# Patient Record
Sex: FEMALE | Race: BLACK OR AFRICAN AMERICAN | Employment: FULL TIME | URBAN - METROPOLITAN AREA
[De-identification: names, ages, dates, MRNs, and addresses within clinical notes are randomized per-mention and may not be internally consistent; named-entity substitution may affect disease eponyms.]

---

## 2017-09-25 ENCOUNTER — HOSPITAL ENCOUNTER (OUTPATIENT)
Dept: LAB | Age: 32
Discharge: HOME | End: 2017-09-25
Payer: COMMERCIAL

## 2017-09-25 ENCOUNTER — OFFICE VISIT (OUTPATIENT)
Dept: OBGYN CLINIC | Age: 32
End: 2017-09-25

## 2017-09-25 VITALS
HEIGHT: 72 IN | BODY MASS INDEX: 34.29 KG/M2 | DIASTOLIC BLOOD PRESSURE: 89 MMHG | HEART RATE: 78 BPM | WEIGHT: 253.2 LBS | SYSTOLIC BLOOD PRESSURE: 131 MMHG

## 2017-09-25 DIAGNOSIS — R35.0 URINARY FREQUENCY: ICD-10-CM

## 2017-09-25 DIAGNOSIS — Z01.419 WELL WOMAN EXAM WITH ROUTINE GYNECOLOGICAL EXAM: Primary | ICD-10-CM

## 2017-09-25 LAB
BILIRUB UR QL STRIP: NEGATIVE
GLUCOSE UR-MCNC: NEGATIVE MG/DL
KETONES P FAST UR STRIP-MCNC: NEGATIVE MG/DL
PH UR STRIP: 5.5 [PH] (ref 4.6–8)
PROT UR QL STRIP: NEGATIVE MG/DL
SP GR UR STRIP: 1.02 (ref 1–1.03)
UA UROBILINOGEN AMB POC: NORMAL (ref 0.2–1)
URINALYSIS CLARITY POC: CLEAR
URINALYSIS COLOR POC: YELLOW
URINE BLOOD POC: NORMAL
URINE LEUKOCYTES POC: NORMAL
URINE NITRITES POC: POSITIVE

## 2017-09-25 PROCEDURE — 87086 URINE CULTURE/COLONY COUNT: CPT | Performed by: OBSTETRICS & GYNECOLOGY

## 2017-09-25 PROCEDURE — 87077 CULTURE AEROBIC IDENTIFY: CPT | Performed by: OBSTETRICS & GYNECOLOGY

## 2017-09-25 PROCEDURE — 88175 CYTOPATH C/V AUTO FLUID REDO: CPT | Performed by: OBSTETRICS & GYNECOLOGY

## 2017-09-25 PROCEDURE — 87624 HPV HI-RISK TYP POOLED RSLT: CPT | Performed by: OBSTETRICS & GYNECOLOGY

## 2017-09-25 PROCEDURE — 87186 SC STD MICRODIL/AGAR DIL: CPT | Performed by: OBSTETRICS & GYNECOLOGY

## 2017-09-25 RX ORDER — NITROFURANTOIN 25; 75 MG/1; MG/1
100 CAPSULE ORAL 2 TIMES DAILY
Qty: 10 CAP | Refills: 0 | Status: SHIPPED | OUTPATIENT
Start: 2017-09-25 | End: 2017-09-30

## 2017-09-25 NOTE — MR AVS SNAPSHOT
Visit Information Date & Time Provider Department Dept. Phone Encounter #  
 9/25/2017  9:15 AM Sheryn Boast [facility] [phone] [id number] Follow-up Instructions Return if symptoms worsen or fail to improve. Upcoming Health Maintenance Date Due  
 PAP AKA CERVICAL CYTOLOGY 1/7/2007 INFLUENZA AGE 9 TO ADULT 8/1/2017 Allergies as of 9/25/2017  Review Complete On: 9/25/2017 By: Sheryn Boast, MD  
 No Known Allergies Current Immunizations  Reviewed on 7/29/2016 Name Date Tdap 5/18/2016  4:10 PM  
  
 Not reviewed this visit You Were Diagnosed With   
  
 Codes Comments Well woman exam with routine gynecological exam    -  Primary ICD-10-CM: F85.539 ICD-9-CM: V72.31 Urinary frequency     ICD-10-CM: R35.0 ICD-9-CM: 788.41 Vitals BP Pulse Height(growth percentile) Weight(growth percentile) LMP BMI  
 131/89 78 6' 3\" (1.905 m) 253 lb 3.2 oz (114.9 kg) 08/17/2017 31.65 kg/m2 OB Status Smoking Status Unknown Never Smoker BMI and BSA Data Body Mass Index Body Surface Area  
 31.65 kg/m 2 2.47 m 2 Preferred Pharmacy Pharmacy Name Phone [facility], [address] [place] [address] [phone] Your Updated Medication List  
  
   
This list is accurate as of: 9/25/17 10:12 AM.  Always use your most recent med list. AMBULATORY BREAST PUMP Double electric breast pump of patient's choice. Use as directed for breast feeding. calcium carbonate 200 mg calcium (500 mg) Chew Commonly known as:  TUMS Take 1 Tab by mouth daily. ibuprofen 800 mg tablet Commonly known as:  MOTRIN Take 1 Tab by mouth every eight (8) hours as needed. nitrofurantoin (macrocrystal-monohydrate) 100 mg capsule Commonly known as:  MACROBID Take 1 Cap by mouth two (2) times a day for 5 days.  Indications: E. COLI URINARY TRACT INFECTION  
  
 norethindrone 0.35 mg Tab Commonly known as:  Law & Law Take 1 Tab by mouth daily. PRENATAL DHA+COMPLETE PRENATAL -300 mg-mcg-mg Cmpk Generic drug:  [id number] tina-folic-dha Take  by mouth. senna-docusate 8.6-50 mg per tablet Commonly known as:  Torin Cardenas Take 1 Tab by mouth two (2) times daily as needed for Constipation. Prescriptions Sent to Pharmacy Refills  
 nitrofurantoin, macrocrystal-monohydrate, (MACROBID) 100 mg capsule 0 Sig: Take 1 Cap by mouth two (2) times a day for 5 days. Indications: E. COLI URINARY TRACT INFECTION Class: Normal  
 Pharmacy: [address], [address] [place] [address] Ph #: [phone] Route: Oral  
  
We Performed the Following AMB POC URINALYSIS DIP STICK AUTO W/O MICRO [47046 CPT(R)] Follow-up Instructions Return if symptoms worsen or fail to improve. Patient Instructions Learning About Stress What is stress? Stress is what you feel when you have to handle more than you are used to. Stress is a fact of life for most people, and it affects everyone differently. What causes stress for you may not be stressful for someone else. A lot of things can cause stress. You may feel stress when you go on a job interview, take a test, or run a race. This kind of short-term stress is normal and even useful. It can help you if you need to work hard or react quickly. For example, stress can help you finish an important job on time. Stress also can last a long time. Long-term stress is caused by stressful situations or events. Examples of long-term stress include long-term health problems, ongoing problems at work, or conflicts in your family. Long-term stress can harm your health. How does stress affect your health? When you are stressed, your body responds as though you are in danger.  It makes hormones that speed up your heart, make you breathe faster, and give you a burst of energy. This is called the fight-or-flight stress response. If the stress is over quickly, your body goes back to normal and no harm is done. But if stress happens too often or lasts too long, it can have bad effects. Long-term stress can make you more likely to get sick, and it can make symptoms of some diseases worse. If you tense up when you are stressed, you may develop neck, shoulder, or low back pain. Stress is linked to high blood pressure and heart disease. Stress also harms your emotional health. It can make you rojas, tense, or depressed. Your relationships may suffer, and you may not do well at work or school. What can you do to manage stress? How to relax your mind · Write. It may help to write about things that are bothering you. This helps you find out how much stress you feel and what is causing it. When you know this, you can find better ways to cope. · Let your feelings out. Talk, laugh, cry, and express anger when you need to. Talking with friends, family, a counselor, or a member of the clergy about your feelings is a healthy way to relieve stress. · Do something you enjoy. For example, listen to music or go to a movie. Practice your hobby or do volunteer work. · Meditate. This can help you relax, because you are not worrying about what happened before or what may happen in the future. · Do guided imagery. Imagine yourself in any setting that helps you feel calm. You can use audiotapes, books, or a teacher to guide you. How to relax your body · Do something active. Exercise or activity can help reduce stress. Walking is a great way to get started. Even everyday activities such as housecleaning or yard work can help. · Do breathing exercises. For example: ¨ From a standing position, bend forward from the waist with your knees slightly bent. Let your arms dangle close to the floor. ¨ Breathe in slowly and deeply as you return to a standing position.  Roll up slowly and lift your head last. 
¨ Hold your breath for just a few seconds in the standing position. ¨ Breathe out slowly and bend forward from the waist. 
· Try yoga or drew chi. These techniques combine exercise and meditation. You may need some training at first to learn them. What can you do to prevent stress? · Manage your time. This helps you find time to do the things you want and need to do. · Get enough sleep. Your body recovers from the stresses of the day while you are sleeping. · Get support. Your family, friends, and community can make a difference in how you experience stress. Where can you learn more? Go to http://damionPicklifyashish.info/. Enter P105 in the search box to learn more about \"Learning About Stress. \" Current as of: July 26, 2016 Content Version: 11.3 © 4553-5565 Cursogram. Care instructions adapted under license by [employer] (which disclaims liability or warranty for this information). If you have questions about a medical condition or this instruction, always ask your healthcare professional. [place] [address] any warranty or liability for your use of this information. Urinary Tract Infection in Women: Care Instructions Your Care Instructions A urinary tract infection, or UTI, is a general term for an infection anywhere between the kidneys and the urethra (where urine comes out). Most UTIs are bladder infections. They often cause pain or burning when you urinate. UTIs are caused by bacteria and can be cured with antibiotics. Be sure to complete your treatment so that the infection goes away. Follow-up care is a key part of your treatment and safety. Be sure to make and go to all appointments, and call your doctor if you are having problems. It's also a good idea to know your test results and keep a list of the medicines you take. How can you care for yourself at home? · Take your antibiotics as directed. Do not stop taking them just because you feel better. You need to take the full course of antibiotics. · Drink extra water and other fluids for the next day or two. This may help wash out the bacteria that are causing the infection. (If you have kidney, heart, or liver disease and have to limit fluids, talk with your doctor before you increase your fluid intake.) · Avoid drinks that are carbonated or have caffeine. They can irritate the bladder. · Urinate often. Try to empty your bladder each time. · To relieve pain, take a hot bath or lay a heating pad set on low over your lower belly or genital area. Never go to sleep with a heating pad in place. To prevent UTIs · Drink plenty of water each day. This helps you urinate often, which clears bacteria from your system. (If you have kidney, heart, or liver disease and have to limit fluids, talk with your doctor before you increase your fluid intake.) · Urinate when you need to. · Urinate right after you have sex. · Change sanitary pads often. · Avoid douches, bubble baths, feminine hygiene sprays, and other feminine hygiene products that have deodorants. · After going to the bathroom, wipe from front to back. When should you call for help? Call your doctor now or seek immediate medical care if: · Symptoms such as fever, chills, nausea, or vomiting get worse or appear for the first time. · You have new pain in your back just below your rib cage. This is called flank pain. · There is new blood or pus in your urine. · You have any problems with your antibiotic medicine. Watch closely for changes in your health, and be sure to contact your doctor if: 
· You are not getting better after taking an antibiotic for 2 days. · Your symptoms go away but then come back. Where can you learn more? Go to http://damion-ashish.info/.  
Enter F522 in the search box to learn more about \"Urinary Tract Infection in Women: Care Instructions. \" Current as of: November 28, 2016 Content Version: 11.3 © 0350-3110 Finjan. Care instructions adapted under license by [employer] (which disclaims liability or warranty for this information). If you have questions about a medical condition or this instruction, always ask your healthcare professional. [address] any warranty or liability for your use of this information. Introducing [facility]! Pradip Edwards introduces Hive Media patient portal. Now you can access parts of your medical record, email your doctor's office, and request medication refills online. 1. In your internet browser, go to [email or web address] 2. Click on the First Time User? Click Here link in the Sign In box. You will see the New Member Sign Up page. 3. Enter your Hive Media Access Code exactly as it appears below. You will not need to use this code after youve completed the sign-up process. If you do not sign up before the expiration date, you must request a new code. · Hive Media Access Code: [email or web address] Expires: 12/24/2017  9:27 AM 
 
4. Enter the last four digits of your Social Security Number (xxxx) and Date of Birth (mm/dd/yyyy) as indicated and click Submit. You will be taken to the next sign-up page. 5. Create a Hive Media ID. This will be your Hive Media login ID and cannot be changed, so think of one that is secure and easy to remember. 6. Create a Hive Media password. You can change your password at any time. 7. Enter your Password Reset Question and Answer. This can be used at a later time if you forget your password. 8. Enter your e-mail address. You will receive e-mail notification when new information is available in [address] 9. Click Sign Up. You can now view and download portions of your medical record. 10. Click the Download Summary menu link to download a portable copy of your medical information. If you have questions, please visit the Frequently Asked Questions section of the Avitidet website. Remember, maufait is NOT to be used for urgent needs. For medical emergencies, dial 911. Now available from your iPhone and Android! Please provide this summary of care documentation to your next provider. Your primary care clinician is listed as NONE. If you have any questions after today's visit, please call [phone].

## 2017-09-25 NOTE — PROGRESS NOTES
Subjective:   33 y.o. female for Well Woman Check. She also notes urinary urgency and frequency for the past 2 weeks on and off. She is under increased stress due to the recent death of her Grandfather. She states she has been taking frequent car trips and holding her urine longer than she should. Patient's last menstrual period was 08/17/2017. Social History: single partner, contraception - condoms. Pertinent past medical hstory: no history of HTN, DVT, CAD, DM, liver disease, migraines or smoking. Patient Active Problem List   Diagnosis Code    Hypoglycemia E16.2    Normal pregnancy Z34.90    Post-term pregnancy, 40-42 weeks of gestation O46.0    Post-dates pregnancy O48.0     Current Outpatient Prescriptions   Medication Sig Dispense Refill    nitrofurantoin, macrocrystal-monohydrate, (MACROBID) 100 mg capsule Take 1 Cap by mouth two (2) times a day for 5 days. Indications: E. COLI URINARY TRACT INFECTION 10 Cap 0    calcium carbonate (TUMS) 200 mg calcium (500 mg) chew Take 1 Tab by mouth daily.  PNV no.24-iron-folic acid-dha (PRENATAL DHA+COMPLETE PRENATAL) -300 mg-mcg-mg cmpk Take  by mouth.  norethindrone (MICRONOR) 0.35 mg tab Take 1 Tab by mouth daily. 28 Tab 11    ibuprofen (MOTRIN) 800 mg tablet Take 1 Tab by mouth every eight (8) hours as needed. 30 Tab 1    senna-docusate (PERICOLACE) 8.6-50 mg per tablet Take 1 Tab by mouth two (2) times daily as needed for Constipation. 60 Tab 0    AMBULATORY BREAST PUMP Double electric breast pump of patient's choice. Use as directed for breast feeding.  1 Units 0     No Known Allergies  Past Medical History:   Diagnosis Date    Hypoglycemia     Hypoglycemia 8/2015    Normal pregnancy 5/4/2016    Post-term pregnancy, 40-42 weeks of gestation 7/26/2016     Past Surgical History:   Procedure Laterality Date    HX OTHER SURGICAL Right 10/2015    right upper second molar taken out     Family History   Problem Relation Age of Onset  Hypertension Mother     Diabetes Mother     High Cholesterol Mother     Heart Disease Father     Hypertension Father     High Cholesterol Father      Social History   Substance Use Topics    Smoking status: Never Smoker    Smokeless tobacco: Never Used    Alcohol use No        ROS:  Feeling well. No dyspnea or chest pain on exertion. No abdominal pain, change in bowel habits, black or bloody stools. No urinary tract symptoms. GYN ROS: normal menses, no abnormal bleeding, pelvic pain or discharge, no breast pain or new or enlarging lumps on self exam. No neurological complaints. Objective:     Visit Vitals    /89    Pulse 78    Ht 6' 3\" (1.905 m)    Wt 253 lb 3.2 oz (114.9 kg)    LMP 2017    BMI 31.65 kg/m2     The patient appears well, alert, oriented x 3, in no distress. ENT normal.  Neck supple. No adenopathy or thyromegaly. ALLEN. Lungs are clear, good air entry, no wheezes, rhonchi or rales. S1 and S2 normal, no murmurs, regular rate and rhythm. Abdomen soft without tenderness, guarding, mass or organomegaly. Extremities show no edema, normal peripheral pulses. Neurological is normal, no focal findings. BREAST EXAM: breasts appear normal, no suspicious masses, no skin or nipple changes or axillary nodes    PELVIC EXAM: normal external genitalia, vulva, vagina, cervix, uterus and adnexa    U/A: + leuk; + nitrites; S.025    Assessment/Plan:   well woman with UTI symptoms and corresponding urinalysis. pap smear  counseled on breast self exam and family planning choices  return annually or prn    ICD-10-CM ICD-9-CM    1. Well woman exam with routine gynecological exam Z01.419 V72.31 PAP IG, APTIMA HPV AND RFX 16/18,45 ([id number])   2. Urinary frequency R35.0 788.41 AMB POC URINALYSIS DIP STICK AUTO W/O MICRO      CULTURE, URINE      nitrofurantoin, macrocrystal-monohydrate, (MACROBID) 100 mg capsule   .

## 2017-09-25 NOTE — PATIENT INSTRUCTIONS
Learning About Stress  What is stress? Stress is what you feel when you have to handle more than you are used to. Stress is a fact of life for most people, and it affects everyone differently. What causes stress for you may not be stressful for someone else. A lot of things can cause stress. You may feel stress when you go on a job interview, take a test, or run a race. This kind of short-term stress is normal and even useful. It can help you if you need to work hard or react quickly. For example, stress can help you finish an important job on time. Stress also can last a long time. Long-term stress is caused by stressful situations or events. Examples of long-term stress include long-term health problems, ongoing problems at work, or conflicts in your family. Long-term stress can harm your health. How does stress affect your health? When you are stressed, your body responds as though you are in danger. It makes hormones that speed up your heart, make you breathe faster, and give you a burst of energy. This is called the fight-or-flight stress response. If the stress is over quickly, your body goes back to normal and no harm is done. But if stress happens too often or lasts too long, it can have bad effects. Long-term stress can make you more likely to get sick, and it can make symptoms of some diseases worse. If you tense up when you are stressed, you may develop neck, shoulder, or low back pain. Stress is linked to high blood pressure and heart disease. Stress also harms your emotional health. It can make you rojas, tense, or depressed. Your relationships may suffer, and you may not do well at work or school. What can you do to manage stress? How to relax your mind  · Write. It may help to write about things that are bothering you. This helps you find out how much stress you feel and what is causing it. When you know this, you can find better ways to cope. · Let your feelings out.  Talk, laugh, cry, and express anger when you need to. Talking with friends, family, a counselor, or a member of the clergy about your feelings is a healthy way to relieve stress. · Do something you enjoy. For example, listen to music or go to a movie. Practice your hobby or do volunteer work. · Meditate. This can help you relax, because you are not worrying about what happened before or what may happen in the future. · Do guided imagery. Imagine yourself in any setting that helps you feel calm. You can use audiotapes, books, or a teacher to guide you. How to relax your body  · Do something active. Exercise or activity can help reduce stress. Walking is a great way to get started. Even everyday activities such as housecleaning or yard work can help. · Do breathing exercises. For example:  ¨ From a standing position, bend forward from the waist with your knees slightly bent. Let your arms dangle close to the floor. ¨ Breathe in slowly and deeply as you return to a standing position. Roll up slowly and lift your head last.  ¨ Hold your breath for just a few seconds in the standing position. ¨ Breathe out slowly and bend forward from the waist.  · Try yoga or drew chi. These techniques combine exercise and meditation. You may need some training at first to learn them. What can you do to prevent stress? · Manage your time. This helps you find time to do the things you want and need to do. · Get enough sleep. Your body recovers from the stresses of the day while you are sleeping. · Get support. Your family, friends, and community can make a difference in how you experience stress. Where can you learn more? Go to http://damion-ashish.info/. Enter J249 in the search box to learn more about \"Learning About Stress. \"  Current as of: July 26, 2016  Content Version: 11.3  © 9760-2631 ImaginAb, [employer].  Care instructions adapted under license by Me[employer]ob (which disclaims liability or warranty for this information). If you have questions about a medical condition or this instruction, always ask your healthcare professional. [place] [address] any warranty or liability for your use of this information. Urinary Tract Infection in Women: Care Instructions  Your Care Instructions    A urinary tract infection, or UTI, is a general term for an infection anywhere between the kidneys and the urethra (where urine comes out). Most UTIs are bladder infections. They often cause pain or burning when you urinate. UTIs are caused by bacteria and can be cured with antibiotics. Be sure to complete your treatment so that the infection goes away. Follow-up care is a key part of your treatment and safety. Be sure to make and go to all appointments, and call your doctor if you are having problems. It's also a good idea to know your test results and keep a list of the medicines you take. How can you care for yourself at home? · Take your antibiotics as directed. Do not stop taking them just because you feel better. You need to take the full course of antibiotics. · Drink extra water and other fluids for the next day or two. This may help wash out the bacteria that are causing the infection. (If you have kidney, heart, or liver disease and have to limit fluids, talk with your doctor before you increase your fluid intake.)  · Avoid drinks that are carbonated or have caffeine. They can irritate the bladder. · Urinate often. Try to empty your bladder each time. · To relieve pain, take a hot bath or lay a heating pad set on low over your lower belly or genital area. Never go to sleep with a heating pad in place. To prevent UTIs  · Drink plenty of water each day. This helps you urinate often, which clears bacteria from your system. (If you have kidney, heart, or liver disease and have to limit fluids, talk with your doctor before you increase your fluid intake.)  · Urinate when you need to.   · Urinate right after you have sex. · Change sanitary pads often. · Avoid douches, bubble baths, feminine hygiene sprays, and other feminine hygiene products that have deodorants. · After going to the bathroom, wipe from front to back. When should you call for help? Call your doctor now or seek immediate medical care if:  · Symptoms such as fever, chills, nausea, or vomiting get worse or appear for the first time. · You have new pain in your back just below your rib cage. This is called flank pain. · There is new blood or pus in your urine. · You have any problems with your antibiotic medicine. Watch closely for changes in your health, and be sure to contact your doctor if:  · You are not getting better after taking an antibiotic for 2 days. · Your symptoms go away but then come back. Where can you learn more? Go to http://damion-ashish.info/. Enter L508 in the search box to learn more about \"Urinary Tract Infection in Women: Care Instructions. \"  Current as of: November 28, 2016  Content Version: 11.3  © 1460-0449 [employer]Flag Software. Care instructions adapted under license by [employer] (which disclaims liability or warranty for this information). If you have questions about a medical condition or this instruction, always ask your healthcare professional. [address] any warranty or liability for your use of this information.

## 2017-09-27 LAB
BACTERIA SPEC CULT: ABNORMAL
SERVICE CMNT-IMP: ABNORMAL

## 2018-05-09 ENCOUNTER — HOSPITAL ENCOUNTER (OUTPATIENT)
Dept: PHYSICAL THERAPY | Age: 33
Discharge: HOME | End: 2018-05-09
Payer: OTHER MISCELLANEOUS

## 2018-05-09 PROCEDURE — 97014 ELECTRIC STIMULATION THERAPY: CPT | Performed by: PHYSICAL THERAPIST

## 2018-05-09 PROCEDURE — 97161 PT EVAL LOW COMPLEX 20 MIN: CPT | Performed by: PHYSICAL THERAPIST

## 2018-05-09 PROCEDURE — 97110 THERAPEUTIC EXERCISES: CPT | Performed by: PHYSICAL THERAPIST

## 2018-05-09 PROCEDURE — 97140 MANUAL THERAPY 1/> REGIONS: CPT | Performed by: PHYSICAL THERAPIST

## 2018-05-09 NOTE — PROGRESS NOTES
PT DAILY TREATMENT NOTE/SHOULDER EVAL 3-16    Patient Name: Chepe Cardona  Date:2018  : 1985  [x]  Patient  Verified  Payor: Ryland Curry [id number] / Plan: [id number] / Product Type: Workers Comp /    In [place]blar Maikel time:6:10  Total Treatment Time (min): 62  Visit #: 1 of 12    Treatment Area: Left shoulder pain [M25.512]    SUBJECTIVE   Pain Level (0-10 scale): 4-7/10  []constant []intermittent []improving [x]worsening []no change since onset    Any medication changes, allergies to medications, adverse drug reactions, diagnosis change, or new procedure performed?: [x] No    [] Yes (see summary sheet for update)  Subjective functional status/changes:     PLOF: Independent with no limitiations  Limitations to PLOF: Lifting, transferring patients, overhead activity, abduction  Mechanism of Injury: Patient was transferring a total assist patient, had increase in left shoulder pain. Current symptoms/Complaints: Patient is a 30 y/o female who presents to outpatient PT with primary complaint of left shoulder pain that began 18 after lifting a patient at work. 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   -Pain also in posterior RTC  -2015 injured left shoulder, had therapy  -denies numbness and tingling   -Pain increases with movement or if patient did a lot of work.   -Has been performing gentle exercises.   -Abduction and ER increases pain the most.   -Right handed  Previous Treatment/Compliance: Patient had therapy in  for left shoulder pain. PMHx/Surgical Hx: NA  Work Hx: Patient works as therapist in [address] [place] [address] [employer]  Living Situation: Has 3year old at home.    Pt Goals: \"Pain free, full ROM\"  Barriers: [x]pain []financial []time []transportation []other  Motivation: excellent  Substance use: []Alcohol []Tobacco []other:   FABQ Score: []low []elevate  Cognition: A & O x 4    Other:    OBJECTIVE/EXAMINATION      Modality rationale: decrease edema, decrease inflammation and decrease pain to improve the patients ability to perform daily activities with decreased pain and symptom levels     Min Type Additional Details   12 [x] Estim:  [x]Unatt       [x]IFC  []Premod                        []Other:  [x]w/ice   []w/heat  Position:  Location:    [] Estim: []Att    []TENS instruct  []NMES                    []Other:  []w/US   []w/ice   []w/heat  Position:  Location:    []  Traction: [] Cervical       []Lumbar                       [] Prone          []Supine                       []Intermittent   []Continuous Lbs:  [] before manual  [] after manual    []  Ultrasound: []Continuous   [] Pulsed                           []1MHz   []3MHz Location:  W/cm2:    []  Iontophoresis with dexamethasone         Location: [] Take home patch   [] In clinic    []  Ice     []  heat  []  Ice massage  []  Laser   []  Anodyne Position:  Location:    []  Laser with stim  []  Other: Position:  Location:    []  Vasopneumatic Device Pressure:       [] lo [] med [] hi   Temperature: [] lo [] med [] hi   [] Skin assessment post-treatment:  []intact []redness- no adverse reaction    []redness  adverse reaction:     15 min [x]Eval                  []Re-Eval       15 min Therapeutic Exercise:  [x] See flow sheet :   Rationale: increase ROM, increase strength and improve coordination to improve the patients ability to perform daily activities with decreased pain and symptom levels        20 min Manual Therapy:  STM to left UT, LS, rhomboids, infraspinatus with use of therapeutic cupping.     Rationale: decrease pain, increase ROM and increase tissue extensibility to perform daily activities with decreased pain and symptom levels                With   [] TE   [] TA   [] neuro   [] other: Patient Education: [x] Review HEP    [] Progressed/Changed HEP based on:   [] positioning   [] body mechanics   [] transfers [] heat/ice application    [] other:      Other Objective/Functional Measures: FOTO: 59    Physical Therapy Evaluation - Shoulder    Posture: [] Poor    [x] Fair    [] Good    Describe: Rounded shoulders bilaterally left > right. Elevated left shoulder blade. ROM:  [] Unable to assess at this time                                           AROM                                                              PROM   Left Right  Left Right   Flexion 120 pain WNL Flexion 150    Scaption/ABD 95 pain WNL Scaptin/    ER @ 90 Degrees WNL WNL ER @ 90 Degrees WNL    IR @ 90 Degrees WNL WNL IR @ 90 Degrees WNL      End Feel / Painful Arc:    Strength:   [] Unable to assess at this time                                                                            L (1-5) R (1-5) Pain   Flexors 4- 5 [x] Yes   [] No   Abductors 4 5 [] Yes   [] No   External Rotators 4+ 5 [] Yes   [] No   Internal Rotators 4+ 5 [] Yes   [] No   Supraspinatus 4- 5 [] Yes   [] No   Serratus Anterior 4- 4 [] Yes   [] No   Lower Trapezius 3+ 4 [] Yes   [] No   Elbow Flexion 4 5 [] Yes   [] No   Elbow Extension 4 5 [] Yes   [] No       Scapulohumoral Control / Rhythm:  Able to eccentrically lower with good control?  Left: [x] Yes   [] No     Right: [x] Yes   [] No    Accessory Motions:     Palpation  -TTP noted left infraspinatus, supraspinatus and long head of biceps      Optional Tests:    Sensation Left Right Reflexes Left Right   Biceps (C5)   Biceps (C5)     Luong Radial(C6-7)   Brachioradialis (C6)     Luong Ulnar(C8-T1)   Triceps (C7)       Adson's Test  [] Pos   [x] Neg Yergason's Test [] Pos   [] Neg  Alexandra's Test  [] Pos   [x] Neg Maya's Sign [] Pos   [] Neg  Neer's Test  [x] Pos   [] Neg Clunk Test  [] Pos   [] Neg  Hawkin's Test  [x] Pos   [] Neg AC Joint  [x] Pos   [] Neg  Speed's Test  [x] Pos   [] Neg SC Joint  [] Pos   [] Neg  Empty Can  [] Pos   [x] Neg Pectoral Tightness [x] Pos   [] Neg  Anterior Apprehension [] Pos   [x] Neg   Posterior Apprehension [] Pos   [x] Neg      Other Tests / Comments:    -Laxity noted in GHJ bilaterally  -Decreased rib mobility noted on left side with deep breathing,   -hypomobility noted in thoracic spine. Pain Level (0-10 scale) post treatment: 3/10    ASSESSMENT/Changes in Function: Patient is a 30 y/o female who presents to outpatient PT with primary complaint of acute left shoulder pain that began 5/4/18 after transferring a total assist patient. 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 Patient demonstrates pain in supraspinatus and long head of biceps tendon and pain with impingement testing consistent with muscular strain. Patient's impairments include decreased rib and thoracic spine mobility, decreased scapular stabilization, GHJ hypermobility and poor posture. Patient will continue to benefit from skilled PT services to modify and progress therapeutic interventions, address functional mobility deficits, address ROM deficits, address strength deficits, analyze and address soft tissue restrictions, analyze and cue movement patterns, analyze and modify body mechanics/ergonomics and assess and modify postural abnormalities to attain remaining goals. []  See Plan of Care  []  See progress note/recertification  []  See Discharge Summary         Progress towards goals / Updated goals:  Short Term Goals: STG- To be accomplished in 2 week(s):  1. Pt will be independent with HEP to encourage prophylaxis. Eval:Initiated this session. Current: NA    Long Term Goals: LTG- To be accomplished in 4 week(s):  1. Pt will demonstrate ability to return to work full time and at full duty without increased left shoulder pain. Eval:Currently on light duty with pain increasing to 7/10 at the end of the day. Current: NA    2.   Pt will increase left shoulder strength to > 4+/5 in order to improve scapular stabilization during functional overhead activities. Eval:   L (1-5) R (1-5)   Flexors 4- 5   Abductors 4 5   External Rotators 4+ 5   Internal Rotators 4+ 5   Supraspinatus 4- 5   Serratus Anterior 4- 4   Lower Trapezius 3+ 4     Current: NA    3. Pt will increase pain free AROM in left shoulder to return to PLOF at work. Eval:   Left Right   Flexion 120 pain WNL   Scaption/ABD 95 pain WNL   ER @ 90 Degrees WNL WNL   IR @ 90 Degrees WNL WNL     Current: NA    4. Pt FOTO score will increase by >15 points to show improvement in subjective shoulder function.    Eval:59  Current: will address at visit 5    PLAN  [x]  Upgrade activities as tolerated     [x]  Continue plan of care  []  Update interventions per flow sheet       []  Discharge due to:_  []  Other:_      Hiro Sibley, PT 5/9/2018  5:09 PM

## 2018-05-09 NOTE — PROGRESS NOTES
[facility] at the [address], [place], [address] [facility]  Phone: [phone]      Fax:  [phone]       Plan of Care/ Statement of Necessity for Physical Therapy Services      Patient name: Cha Damon Start of Care: 2018   Referral source: Virginia Tuttle MD : 1985    Medical Diagnosis: Left shoulder pain [M25.512]   Onset Date:18    Treatment Diagnosis: Left shoulder pain   Prior Hospitalization: see medical history Provider#: [id number]   Medications: Verified on Patient summary List    Comorbidities: NA   Prior Level of Function: Independent with no limitiations  Limitations to PLOF: Lifting, transferring patients, overhead activity, abduction      The Plan of Care and following information is based on the information from the initial evaluation. Assessment/ key information: Patient is a 30 y/o female who presents to outpatient PT with primary complaint of acute left shoulder pain that began 18 after transferring a total assist patient. 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 Patient demonstrates pain in supraspinatus and long head of biceps tendon and pain with impingement testing consistent with muscular strain. Patient's impairments include decreased rib and thoracic spine mobility, decreased scapular stabilization, GHJ hypermobility and poor posture.      Patient will continue to benefit from skilled PT services to modify and progress therapeutic interventions, address functional mobility deficits, address ROM deficits, address strength deficits, analyze and address soft tissue restrictions, analyze and cue movement patterns, analyze and modify body mechanics/ergonomics and assess and modify postural abnormalities to attain remaining goals.     Evaluation Complexity History LOW Complexity : Zero comorbidities / personal factors that will impact the outcome / POC; Examination LOW Complexity : 1-2 Standardized tests and measures addressing body structure, function, activity limitation and / or participation in recreation  ;Presentation LOW Complexity : Stable, uncomplicated  ;Clinical Decision Making MEDIUM Complexity : FOTO score of 26-74  Overall Complexity Rating: LOW   Problem List: pain affecting function, decrease ROM, decrease strength, decrease ADL/ functional abilitiies, decrease activity tolerance and decrease flexibility/ joint mobility   Treatment Plan may include any combination of the following: Therapeutic exercise, Therapeutic activities, Neuromuscular re-education, Physical agent/modality, Manual therapy, Patient education and Functional mobility training  Patient / Family readiness to learn indicated by: asking questions and interest  Persons(s) to be included in education: patient (P)  Barriers to Learning/Limitations: None  Patient Goal (s): Pain free, full ROM  Patient Self Reported Health Status: good  Rehabilitation Potential: excellent    Progress towards goals / Updated goals:  Short Term Goals: STG- To be accomplished in 2 week(s):  1. Pt will be independent with HEP to encourage prophylaxis. Eval:Initiated this session. Current: NA     Long Term Goals: LTG- To be accomplished in 4 week(s):  1. Pt will demonstrate ability to return to work full time and at full duty without increased left shoulder pain. Eval:Currently on light duty with pain increasing to 7/10 at the end of the day. Current: NA     2.  Pt will increase left shoulder strength to > 4+/5 in order to improve scapular stabilization during functional overhead activities. Eval:    L (1-5) R (1-5)   Flexors 4- 5   Abductors 4 5   External Rotators 4+ 5   Internal Rotators 4+ 5   Supraspinatus 4- 5   Serratus Anterior 4- 4   Lower Trapezius 3+ 4      Current: NA     3.  Pt will increase pain free AROM in left shoulder to return to PLOF at work.    Eval:    Left Right   Flexion 120 pain WNL Scaption/ABD 95 pain WNL   ER @ 90 Degrees WNL WNL   IR @ 90 Degrees WNL WNL      Current: NA     4. Pt FOTO score will increase by >15 points to show improvement in subjective shoulder function. Eval:59  Current: will address at visit 5    Frequency / Duration: Patient to be seen 3 times per week for 4 weeks. Patient/ Caregiver education and instruction: Diagnosis, prognosis, self care and exercises   [x]  Plan of care has been reviewed with ANNA Harris, PT 5/9/2018 6:28 PM  _____________________________________________________________________  I certify that the above Therapy Services are being furnished while the patient is under my care. I agree with the treatment plan and certify that this therapy is necessary.     Physician's Signature:____________________  Date:__________Time:______    Please sign and return to [facility] at the [address], [address] [facility]       Phone: [phone]      Fax:  [phone]

## 2018-05-11 ENCOUNTER — APPOINTMENT (OUTPATIENT)
Dept: PHYSICAL THERAPY | Age: 33
End: 2018-05-11
Payer: OTHER MISCELLANEOUS

## 2018-05-14 ENCOUNTER — HOSPITAL ENCOUNTER (OUTPATIENT)
Dept: PHYSICAL THERAPY | Age: 33
Discharge: HOME | End: 2018-05-14
Payer: OTHER MISCELLANEOUS

## 2018-05-14 PROCEDURE — 97530 THERAPEUTIC ACTIVITIES: CPT | Performed by: PHYSICAL THERAPIST

## 2018-05-14 PROCEDURE — 97140 MANUAL THERAPY 1/> REGIONS: CPT | Performed by: PHYSICAL THERAPIST

## 2018-05-14 PROCEDURE — 97014 ELECTRIC STIMULATION THERAPY: CPT | Performed by: PHYSICAL THERAPIST

## 2018-05-14 PROCEDURE — 97110 THERAPEUTIC EXERCISES: CPT | Performed by: PHYSICAL THERAPIST

## 2018-05-14 NOTE — PROGRESS NOTES
PT DAILY TREATMENT NOTE     Patient Name: Mari Montaño  Date:2018  : 1985  [x]  Patient  Verified  Payor: Ryland Deanadonayjohn Rogeliocynthia [phone] / Plan: [id number] / Product Type: Workers Comp /    In time:4:30  Out time:5:48  Total Treatment Time (min): 78  Visit #: 2 of 12    Treatment Area: Left shoulder pain [M25.512]    SUBJECTIVE   Pain Level (0-10 scale): 6/10  Any medication changes, allergies to medications, adverse drug reactions, diagnosis change, or new procedure performed?: [x] No    [] Yes (see summary sheet for update)  Subjective functional status/changes:   [] No changes reported  \"I was definitely sore doing the exercises. \"    OBJECTIVE    Modality rationale: decrease edema, decrease inflammation and decrease pain to improve the patients ability to perform daily activities with decreased pain and symptom levels     Min Type Additional Details   15 [x] Estim:  [x]Unatt       [x]IFC  []Premod                        []Other:  []w/ice   []w/heat  Position:  Location:    [] Estim: []Att    []TENS instruct  []NMES                    []Other:  []w/US   []w/ice   []w/heat  Position:  Location:    []  Traction: [] Cervical       []Lumbar                       [] Prone          []Supine                       []Intermittent   []Continuous Lbs:  [] before manual  [] after manual    []  Ultrasound: []Continuous   [] Pulsed                           []1MHz   []3MHz W/cm2:  Location:    []  Iontophoresis with dexamethasone         Location: [] Take home patch   [] In clinic    []  Ice     []  heat  []  Ice massage  []  Laser   []  Anodyne Position:  Location:    []  Laser with stim  []  Other:  Position:  Location:    []  Vasopneumatic Device Pressure:       [] lo [] med [] hi   Temperature: [] lo [] med [] hi   [] Skin assessment post-treatment:  []intact []redness- no adverse reaction    []redness  adverse reaction:       36 min Therapeutic Exercise:  [x] See flow sheet : Rationale: increase ROM, increase strength and improve coordination to improve the patients ability to perform daily activities with decreased pain and symptom levels      12 min Therapeutic Activity:  [x]  See flow sheet :   Rationale: increase ROM, increase strength and improve coordination  to improve the patients ability to perform daily activities with decreased pain and symptom levels         15 min Manual Therapy:  Grade 2 and 3 thoracic joint mobs to improve mobility, rib mobilization on left side. Ischemic release to left rhomboids, middle trap and infraspinatus. Rationale: decrease pain, increase ROM and increase tissue extensibility to perform daily activities with decreased pain and symptom levels          With   [] TE   [] TA   [] neuro   [] other: Patient Education: [x] Review HEP    [] Progressed/Changed HEP based on:   [] positioning   [] body mechanics   [] transfers   [] heat/ice application    [] other:      Other Objective/Functional Measures:   -Cues to recruit SA during q-ped activities     Pain Level (0-10 scale) post treatment: 1/10    ASSESSMENT/Changes in Function: Patient demonstrates trigger points in left rhomboids and middle trap. Decreased pain following today's session. Patient will continue to benefit from skilled PT services to modify and progress therapeutic interventions, address functional mobility deficits, address ROM deficits, address strength deficits, analyze and address soft tissue restrictions, analyze and cue movement patterns, analyze and modify body mechanics/ergonomics and assess and modify postural abnormalities to attain remaining goals. []  See Plan of Care  []  See progress note/recertification  []  See Discharge Summary         Progress towards goals / Updated goals:  Short Term Goals: STG- To be accomplished in 2 week(s):  1.  Pt will be independent with HEP to encourage prophylaxis. Eval:Initiated this session.    Current: Patient reports compliance with current program-MET      Long Term Goals: LTG- To be accomplished in 4 week(s):  1.  Pt will demonstrate ability to return to work full time and at full duty without increased left shoulder pain. Eval:Currently on light duty with pain increasing to 7/10 at the end of the day. Current: NA      2.  Pt will increase left shoulder strength to > 4+/5 in order to improve scapular stabilization during functional overhead activities. Eval:     L (1-5) R (1-5)   Flexors 4- 5   Abductors 4 5   External Rotators 4+ 5   Internal Rotators 4+ 5   Supraspinatus 4- 5   Serratus Anterior 4- 4   Lower Trapezius 3+ 4       Current: NA      3.  Pt will increase pain free AROM in left shoulder to return to PLOF at work. Eval:     Left Right   Flexion 120 pain WNL   Scaption/ABD 95 pain WNL   ER @ 90 Degrees WNL WNL   IR @ 90 Degrees WNL WNL       Current: NA      4.  Pt FOTO score will increase by >15 points to show improvement in subjective shoulder function.    Eval:59   Current: will address at visit 5      PLAN  [x]  Upgrade activities as tolerated     [x]  Continue plan of care  []  Update interventions per flow sheet       []  Discharge due to:_  []  Other:_      Laine Jolly PT 5/14/2018  4:27 PM    Future Appointments  Date Time Provider [place]   5/14/2018 4:30 PM Laine Jolly PT [facility]   5/17/2018 5:00 PM Corrine Palomares PT, DPT [facility]   5/18/2018 7:30 AM Laine Jolly PT [facility]   5/21/2018 5:00 PM Laine Jolly PT [facility]   5/23/2018 5:00 PM Laine Jolly PT [facility]   5/24/2018 5:30 PM Laine Jolly PT [facility]   5/29/2018 5:00 PM Corrine Palomares, PT, DPT [facility]   5/30/2018 5:00 PM MARTIN Rueda [facility]   5/31/2018 5:00 PM Corrine Palomares PT, DPT JANIE [facility]

## 2018-05-17 ENCOUNTER — HOSPITAL ENCOUNTER (OUTPATIENT)
Dept: PHYSICAL THERAPY | Age: 33
Discharge: HOME | End: 2018-05-17
Payer: OTHER MISCELLANEOUS

## 2018-05-17 PROCEDURE — 97140 MANUAL THERAPY 1/> REGIONS: CPT

## 2018-05-17 PROCEDURE — 97110 THERAPEUTIC EXERCISES: CPT

## 2018-05-17 NOTE — PROGRESS NOTES
PT DAILY TREATMENT NOTE     Patient Name: Dwyane Goodpasture  Date:2018  : 1985  [x]  Patient  Verified  Payor: Ryland Teague Maddie Haleslime [id number] / Plan: [id number] / Product Type: Workers Comp /    In time:5:01pm  Out time:6:12pm  Total Treatment Time (min): 71  Visit #: 3 of 12    Treatment Area: Left shoulder pain [M25.512]    SUBJECTIVE  Pain Level (0-10 scale): 10  Any medication changes, allergies to medications, adverse drug reactions, diagnosis change, or new procedure performed?: [x] No    [] Yes (see summary sheet for update)  Subjective functional status/changes:   [] No changes reported  \"I'm having some left scapular pain. \" Pt states that she is more limited with right side-bending compared to left, and she thinks thoracic vertebrae or ribs may be limiting issue.     OBJECTIVE    Modality rationale: decrease inflammation and decrease pain to improve the patients ability to perform daily activities with decreased pain and symptom levels   Min Type Additional Details    [] Estim:  []Unatt       []IFC  []Premod                        []Other:  []w/ice   []w/heat  Position:  Location:    [] Estim: []Att    []TENS instruct  []NMES                    []Other:  []w/US   []w/ice   []w/heat  Position:  Location:    []  Traction: [] Cervical       []Lumbar                       [] Prone          []Supine                       []Intermittent   []Continuous Lbs:  [] before manual  [] after manual    []  Ultrasound: []Continuous   [] Pulsed                           []1MHz   []3MHz W/cm2:  Location:    []  Iontophoresis with dexamethasone         Location: [] Take home patch   [] In clinic   10 [x]  Ice     []  heat  []  Ice massage  []  Laser   []  Anodyne Position: Seated  Location: Left shoulder    []  Laser with stim  []  Other:  Position:  Location:    []  Vasopneumatic Device Pressure:       [] lo [] med [] hi   Temperature: [] lo [] med [] hi   [x] Skin assessment post-treatment:  [x]intact []redness- no adverse reaction    []redness  adverse reaction:     51 min Therapeutic Exercise:  [x] See flow sheet :   Rationale: increase ROM and increase strength to improve the patients ability to perform daily activities with decreased pain and symptom levels    10 min Manual Therapy:  STM to subscapularis and infraspinatus. Lat stretching with scapular stabilization. Rationale: decrease pain, increase ROM, increase tissue extensibility and decrease trigger points to perform daily activities with decreased pain and symptom levels          With   [] TE   [] TA   [] neuro   [] other: Patient Education: [x] Review HEP    [] Progressed/Changed HEP based on:   [] positioning   [] body mechanics   [] transfers   [] heat/ice application    [] other:      Other Objective/Functional Measures:     Hypertonicity noted in left subscapularis and infraspinatus. Pain Level (0-10 scale) post treatment: 1/10    ASSESSMENT/Changes in Function:     Pain in anterior shoulder with bilateral shoulder ER in supine. Pt struggled with increase in repetitions for wall pushups. Pt feels better after treatment today. Patient will continue to benefit from skilled PT services to modify and progress therapeutic interventions, address functional mobility deficits, address ROM deficits, address strength deficits, analyze and address soft tissue restrictions, analyze and cue movement patterns, analyze and modify body mechanics/ergonomics, assess and modify postural abnormalities and instruct in home and community integration to attain remaining goals. [x]  See Plan of Care  []  See progress note/recertification  []  See Discharge Summary         Progress towards goals / Updated goals:  Short Term Goals: STG- To be accomplished in 2 week(s):  1.  Pt will be independent with HEP to encourage prophylaxis. Angel:Initiated this session.    Current: Patient reports compliance with current program-MET      Long Term Goals: LTG- To be accomplished in 4 week(s):  1.  Pt will demonstrate ability to return to work full time and at full duty without increased left shoulder pain. Eval:Currently on light duty with pain increasing to 7/10 at the end of the day. Current: progressing- On light duty with pain increasing to 5-6/10 at the end of the day.      2.  Pt will increase left shoulder strength to > 4+/5 in order to improve scapular stabilization during functional overhead activities. Eval:     L (1-5) R (1-5)   Flexors 4- 5   Abductors 4 5   External Rotators 4+ 5   Internal Rotators 4+ 5   Supraspinatus 4- 5   Serratus Anterior 4- 4   Lower Trapezius 3+ 4       Current: NA      3.  Pt will increase pain free AROM in left shoulder to return to PLOF at work. Eval:     Left Right   Flexion 120 pain WNL   Scaption/ABD 95 pain WNL   ER @ 90 Degrees WNL WNL   IR @ 90 Degrees WNL WNL       Current: NA      4.  Pt FOTO score will increase by >15 points to show improvement in subjective shoulder function.    Eval:59   Current: will address at visit 5      PLAN  [x]  Upgrade activities as tolerated     [x]  Continue plan of care  []  Update interventions per flow sheet       []  Discharge due to:_  []  Other:_      CELESTINO Chavez  5/17/2018  5:07 PM    Future Appointments  Date Time Provider Leah Sánchez   5/18/2018 7:30 AM MARTIN StuartHPDAVIN [facility]   5/21/2018 5:00 PM MARTIN StuartHPDAVIN [facility]   5/23/2018 5:00 PM MARTIN StuartHPDAVIN [facility]   5/24/2018 5:30 PM MARTIN StuartHPDAVIN [facility]   5/29/2018 5:00 PM Bruna Romberg, PT, DPT [facility]   5/30/2018 5:00 PM MARTIN StuartHPDAVIN [facility]   5/31/2018 5:00 PM Bruna Romberg, PT, DPT [facility]

## 2018-05-18 ENCOUNTER — HOSPITAL ENCOUNTER (OUTPATIENT)
Dept: PHYSICAL THERAPY | Age: 33
Discharge: HOME | End: 2018-05-18
Payer: OTHER MISCELLANEOUS

## 2018-05-18 PROCEDURE — 97110 THERAPEUTIC EXERCISES: CPT | Performed by: PHYSICAL THERAPIST

## 2018-05-18 PROCEDURE — 97530 THERAPEUTIC ACTIVITIES: CPT | Performed by: PHYSICAL THERAPIST

## 2018-05-18 NOTE — PROGRESS NOTES
PT DAILY TREATMENT NOTE     Patient Name: Syed Romero  Date:2018  : 1985  [x]  Patient  Verified  Payor: Ryland Deanadonayjohn Rogeliocynthia [id number] / Plan: [id number] / Product Type: Workers Comp /    In time:7:30  Out time:8:28  Total Treatment Time (min): 62  Visit #: 4 of 12    Treatment Area: Left shoulder pain [M25.512]    SUBJECTIVE  Pain Level (0-10 scale): 4/10  Any medication changes, allergies to medications, adverse drug reactions, diagnosis change, or new procedure performed?: [x] No    [] Yes (see summary sheet for update)  Subjective functional status/changes:   [] No changes reported  \"I am a little sore from yesterday. \"    OBJECTIVE      44 min Therapeutic Exercise:  [x] See flow sheet :   Rationale: increase ROM, increase strength and improve coordination to improve the patients ability to perform daily activities with decreased pain and symptom levels      14 min Therapeutic Activity:  [x]  See flow sheet :   Rationale: increase ROM, increase strength and improve coordination  to improve the patients ability to perform daily activities with decreased pain and symptom levels                 With   [] TE   [] TA   [] neuro   [] other: Patient Education: [x] Review HEP    [] Progressed/Changed HEP based on:   [] positioning   [] body mechanics   [] transfers   [] heat/ice application    [] other:      Other Objective/Functional Measures:   -compensatory thoracic extension during wall slides     Pain Level (0-10 scale) post treatment: 1/10    ASSESSMENT/Changes in Function: Patient is tolerating progressions of exercises without increased pain.      Patient will continue to benefit from skilled PT services to modify and progress therapeutic interventions, address functional mobility deficits, address ROM deficits, address strength deficits, analyze and address soft tissue restrictions, analyze and cue movement patterns, analyze and modify body mechanics/ergonomics and assess and modify postural abnormalities to attain remaining goals. []  See Plan of Care  []  See progress note/recertification  []  See Discharge Summary         :  Progress towards goals / Updated goals:  Short Term Goals: STG- To be accomplished in 2 week(s):  1.  Pt will be independent with HEP to encourage prophylaxis. Eval:Initiated this session. Current: Patient reports compliance with current program-MET      Long Term Goals: LTG- To be accomplished in 4 week(s):  1.  Pt will demonstrate ability to return to work full time and at full duty without increased left shoulder pain. Eval:Currently on light duty with pain increasing to 7/10 at the end of the day. Current: progressing- On light duty with pain increasing to 5-6/10 at the end of the day.      2.  Pt will increase left shoulder strength to > 4+/5 in order to improve scapular stabilization during functional overhead activities. Eval:     L (1-5) R (1-5)   Flexors 4- 5   Abductors 4 5   External Rotators 4+ 5   Internal Rotators 4+ 5   Supraspinatus 4- 5   Serratus Anterior 4- 4   Lower Trapezius 3+ 4       Current: NA      3.  Pt will increase pain free AROM in left shoulder to return to PLOF at work. Eval:     Left Right   Flexion 120 pain WNL   Scaption/ABD 95 pain WNL   ER @ 90 Degrees WNL WNL   IR @ 90 Degrees WNL WNL       Current: NA      4.  Pt FOTO score will increase by >15 points to show improvement in subjective shoulder function.    Eval:59   Current: will address at visit 5      PLAN  [x]  Upgrade activities as tolerated     [x]  Continue plan of care  []  Update interventions per flow sheet       []  Discharge due to:_  []  Other:_      Maribel Wilkinson PT 5/18/2018  7:40 AM    Future Appointments  Date Time Provider Leah Sánchez   5/21/2018 5:00 PM MARTIN Guerra [facility]   5/23/2018 5:00 PM MARTIN Guerra [facility]   5/24/2018 5:30 PM MARTIN Guerra [facility]   5/29/2018 5:00 PM Caesar Turner Barbara Courser, DPT [facility]   5/30/2018 5:00 PM Annia Wharton, PT [facility]   5/31/2018 5:00 PM Carmen Jarquin, PT, DPT [facility]

## 2018-05-21 ENCOUNTER — HOSPITAL ENCOUNTER (OUTPATIENT)
Dept: PHYSICAL THERAPY | Age: 33
Discharge: HOME | End: 2018-05-21
Payer: OTHER MISCELLANEOUS

## 2018-05-21 PROCEDURE — 97110 THERAPEUTIC EXERCISES: CPT

## 2018-05-21 PROCEDURE — 97530 THERAPEUTIC ACTIVITIES: CPT

## 2018-05-21 NOTE — PROGRESS NOTES
PT DAILY TREATMENT NOTE     Patient Name: Leon Garibay  Date:2018  : 1985  [x]  Patient  Verified  Payor: Ryland Deanadonayjohn Rogeliocynthia [id number] / Plan: [id number] / Product Type: Workers Comp /    In time:5:04pm  Out time:6:10pm  Total Treatment Time (min): 66  Visit #: 5 of 12    Treatment Area: Left shoulder pain [M25.512]    SUBJECTIVE  Pain Level (0-10 scale): 0/10  Any medication changes, allergies to medications, adverse drug reactions, diagnosis change, or new procedure performed?: [x] No    [] Yes (see summary sheet for update)  Subjective functional status/changes:   [] No changes reported  \"I have no pain today, I'm just having trouble getting those last couple degrees of flexion. I can hold my baby without pain. \" Pt reports no pain with reaching overhead, just limited ROM.     OBJECTIVE    Modality rationale: decrease inflammation and decrease pain to improve the patients ability to perform daily activities with decreased pain and symptom levels   Min Type Additional Details    [] Estim:  []Unatt       []IFC  []Premod                        []Other:  []w/ice   []w/heat  Position:  Location:    [] Estim: []Att    []TENS instruct  []NMES                    []Other:  []w/US   []w/ice   []w/heat  Position:  Location:    []  Traction: [] Cervical       []Lumbar                       [] Prone          []Supine                       []Intermittent   []Continuous Lbs:  [] before manual  [] after manual    []  Ultrasound: []Continuous   [] Pulsed                           []1MHz   []3MHz W/cm2:  Location:    []  Iontophoresis with dexamethasone         Location: [] Take home patch   [] In clinic   10 [x]  Ice     []  heat  []  Ice massage  []  Laser   []  Anodyne Position: Seated  Location: Left shoulder    []  Laser with stim  []  Other:  Position:  Location:    []  Vasopneumatic Device Pressure:       [] lo [] med [] hi   Temperature: [] lo [] med [] hi   [x] Skin assessment post-treatment:  [x]intact []redness- no adverse reaction    []redness  adverse reaction:     46 min Therapeutic Exercise:  [x] See flow sheet :   Rationale: increase ROM, increase strength and improve coordination to improve the patients ability to perform daily activities with decreased pain and symptom levels    10 min Therapeutic Activity:  [x]  See flow sheet :   Rationale: increase ROM, increase strength and improve coordination  to improve the patients ability to perform daily activities with decreased pain and symptom levels          With   [] TE   [] TA   [] neuro   [] other: Patient Education: [x] Review HEP    [] Progressed/Changed HEP based on:   [] positioning   [] body mechanics   [] transfers   [] heat/ice application    [] other:      Other Objective/Functional Measures:   Compensatory movement with shoulder diagonals in standing after 90 degrees of flexion. Pain Level (0-10 scale) post treatment: 0/10    ASSESSMENT/Changes in Function:     Pt needed multiple rest breaks during wall slides today; however, less compensation today. Pt unable to perform standing shoulder diagonals without compensation, but able to perform them supine. Minimal scapular pain during exercises. Patient will continue to benefit from skilled PT services to modify and progress therapeutic interventions, address functional mobility deficits, address ROM deficits, address strength deficits, analyze and address soft tissue restrictions, analyze and cue movement patterns, analyze and modify body mechanics/ergonomics, assess and modify postural abnormalities and instruct in home and community integration to attain remaining goals. [x]  See Plan of Care  []  See progress note/recertification  []  See Discharge Summary         Progress towards goals / Updated goals:  Short Term Goals: STG- To be accomplished in 2 week(s):  1.  Pt will be independent with HEP to encourage prophylaxis. Angel:Initiated this session. Current: Patient reports compliance with current program-MET      Long Term Goals: LTG- To be accomplished in 4 week(s):  1.  Pt will demonstrate ability to return to work full time and at full duty without increased left shoulder pain. Eval:Currently on light duty with pain increasing to 7/10 at the end of the day. Current: progressing- On light duty with pain increasing to 5-6/10 at the end of the day.      2.  Pt will increase left shoulder strength to > 4+/5 in order to improve scapular stabilization during functional overhead activities. Eval:     L (1-5) R (1-5)   Flexors 4- 5   Abductors 4 5   External Rotators 4+ 5   Internal Rotators 4+ 5   Supraspinatus 4- 5   Serratus Anterior 4- 4   Lower Trapezius 3+ 4       Current: NA      3.  Pt will increase pain free AROM in left shoulder to return to PLOF at work. Eval:     Left Right   Flexion 120 pain WNL   Scaption/ABD 95 pain WNL   ER @ 90 Degrees WNL WNL   IR @ 90 Degrees WNL WNL       Current: NA      4.  Pt FOTO score will increase by >15 points to show improvement in subjective shoulder function.    Eval:59   Current: 71      PLAN  [x]  Upgrade activities as tolerated     [x]  Continue plan of care  []  Update interventions per flow sheet       []  Discharge due to:_  []  Other:_      Lee Morrissey, CELESTINO  5/21/2018  5:09 PM    Future Appointments  Date Time Provider Leah Sánchez   5/23/2018 5:00 PM Duane Hoffmann, PT [facility]   5/24/2018 5:30 PM Duane Hoffmann, PT [facility]   5/29/2018 5:00 PM Saintclair Butte, PT, DPT [facility]   5/30/2018 5:00 PM Duane Hoffmann, PT [facility]   5/31/2018 5:00 PM Saintclair Butte, PT, DPT [facility]

## 2018-05-23 ENCOUNTER — HOSPITAL ENCOUNTER (OUTPATIENT)
Dept: PHYSICAL THERAPY | Age: 33
Discharge: HOME | End: 2018-05-23
Payer: OTHER MISCELLANEOUS

## 2018-05-23 PROCEDURE — 97530 THERAPEUTIC ACTIVITIES: CPT

## 2018-05-23 PROCEDURE — 97110 THERAPEUTIC EXERCISES: CPT

## 2018-05-23 NOTE — PROGRESS NOTES
PT DAILY TREATMENT NOTE     Patient Name: Kerry Henao  Date:2018  : 1985  [x]  Patient  Verified  Payor: Ryland Teague Maddie Haleslime [id number] / Plan: [id number] / Product Type: Workers Comp /    In time:5:00pm  Out time:6:03pm  Total Treatment Time (min): 63  Visit #: 6 of 12    Treatment Area: Left shoulder pain [M25.512]    SUBJECTIVE  Pain Level (0-10 scale): 1/10  Any medication changes, allergies to medications, adverse drug reactions, diagnosis change, or new procedure performed?: [x] No    [] Yes (see summary sheet for update)  Subjective functional status/changes:   [] No changes reported  \"I was only at about a 1/10 after work. I'm getting better. \"    OBJECTIVE    Modality rationale: decrease inflammation and decrease pain to improve the patients ability to perform daily activities with decreased pain and symptom levels   Min Type Additional Details    [] Estim:  []Unatt       []IFC  []Premod                        []Other:  []w/ice   []w/heat  Position:  Location:    [] Estim: []Att    []TENS instruct  []NMES                    []Other:  []w/US   []w/ice   []w/heat  Position:  Location:    []  Traction: [] Cervical       []Lumbar                       [] Prone          []Supine                       []Intermittent   []Continuous Lbs:  [] before manual  [] after manual    []  Ultrasound: []Continuous   [] Pulsed                           []1MHz   []3MHz W/cm2:  Location:    []  Iontophoresis with dexamethasone         Location: [] Take home patch   [] In clinic   10 [x]  Ice     []  heat  []  Ice massage  []  Laser   []  Anodyne Position: Seated  Location: Left shoulder/scap    []  Laser with stim  []  Other:  Position:  Location:    []  Vasopneumatic Device Pressure:       [] lo [] med [] hi   Temperature: [] lo [] med [] hi   [x] Skin assessment post-treatment:  [x]intact []redness- no adverse reaction    []redness  adverse reaction:     43 min Therapeutic Exercise:  [x] See flow sheet :   Rationale: increase ROM, increase strength and improve coordination to improve the patients ability to perform daily activities with decreased pain and symptom levels. 10 min Therapeutic Activity:  [x]  See flow sheet :   Rationale: increase ROM, increase strength and improve coordination  to improve the patients ability to perform daily activities with decreased pain and symptom levels          With   [] TE   [] TA   [] neuro   [] other: Patient Education: [x] Review HEP    [] Progressed/Changed HEP based on:   [] positioning   [] body mechanics   [] transfers   [] heat/ice application    [] other:      Other Objective/Functional Measures:     Pt able to perform standing diagonal shoulder flexion without compensation. Pain Level (0-10 scale) post treatment: 0/10    ASSESSMENT/Changes in Function:     Pt AROM increasing without pain. Discussed potential discharge with pt, and she feels uncomfortable without returning to full duty at work first; pt states she will attempt tomorrow. Scapular strength has not significantly improved yet, but still progressing. Patient will continue to benefit from skilled PT services to modify and progress therapeutic interventions, address functional mobility deficits, address ROM deficits, address strength deficits, analyze and address soft tissue restrictions, analyze and cue movement patterns, analyze and modify body mechanics/ergonomics, assess and modify postural abnormalities and instruct in home and community integration to attain remaining goals. [x]  See Plan of Care  []  See progress note/recertification  []  See Discharge Summary         Progress towards goals / Updated goals:  1.  Pt will be independent with HEP to encourage prophylaxis. Eval:Initiated this session.    Current: Patient reports compliance with current program-MET      Long Term Goals: LTG- To be accomplished in 4 week(s):  1.  Pt will demonstrate ability to return to work full time and at full duty without increased left shoulder pain. Eval:Currently on light duty with pain increasing to 7/10 at the end of the day. Current: progressing- On light duty with pain of 1/10 at the end of the day. Pt reports that she will attempt full duty tomorrow.      2.  Pt will increase left shoulder strength to > 4+/5 in order to improve scapular stabilization during functional overhead activities. Eval:     L (1-5) R (1-5)   Flexors 4- 5   Abductors 4 5   External Rotators 4+ 5   Internal Rotators 4+ 5   Supraspinatus 4- 5   Serratus Anterior 4- 4   Lower Trapezius 3+ 4       Current: Left Lower trap= 3+      3.  Pt will increase pain free AROM in left shoulder to return to PLOF at work. Eval:     Left Right   Flexion 120 pain WNL   Scaption/ABD 95 pain WNL   ER @ 90 Degrees WNL WNL   IR @ 90 Degrees WNL WNL       Current: MET:Left kqrndxf=729, no pain , Left scaption=160, no pain, but \"hard to do. \"      4.  Pt FOTO score will increase by >15 points to show improvement in subjective shoulder function.    Eval:59   Current: 71      PLAN  [x]  Upgrade activities as tolerated     [x]  Continue plan of care  []  Update interventions per flow sheet       []  Discharge due to:_  []  Other:_      CELESTINO Salazar  5/23/2018  5:08 PM    Future Appointments  Date Time Provider Leah Sánchez   5/24/2018 5:30 PM MARTIN McfaddenHPYESIW [facility]   5/29/2018 5:00 PM Sha Mcclelland PT, DPT [facility]   5/30/2018 5:00 PM MARTIN McfaddenHPDAVIN [facility]   5/31/2018 5:00 PM Sha Mcclelland PT, DPT [facility]

## 2018-05-24 ENCOUNTER — HOSPITAL ENCOUNTER (OUTPATIENT)
Dept: PHYSICAL THERAPY | Age: 33
Discharge: HOME | End: 2018-05-24
Payer: OTHER MISCELLANEOUS

## 2018-05-24 PROCEDURE — 97110 THERAPEUTIC EXERCISES: CPT

## 2018-05-24 PROCEDURE — 97530 THERAPEUTIC ACTIVITIES: CPT

## 2018-05-24 NOTE — PROGRESS NOTES
PT DAILY TREATMENT NOTE     Patient Name: Erin Guzman  Date:2018  : 1985  [x]  Patient  Verified  Payor: Ryland Deanadonayjohn Rogeliocynthia [id number] / Plan: [address] / Product Type: Workers Comp /    In time:5:30pm  Out time:6:23pm  Total Treatment Time (min): 48  Visit #: 7 of 12    Treatment Area: Left shoulder pain [M25.512]    SUBJECTIVE  Pain Level (0-10 scale): 0/10  Any medication changes, allergies to medications, adverse drug reactions, diagnosis change, or new procedure performed?: [x] No    [] Yes (see summary sheet for update)  Subjective functional status/changes:   [] No changes reported  Pt reports having to work with a pt that was total assist at work, and was only in 3/10 pain at the end of the day. OBJECTIVE      30 min Therapeutic Exercise:  [x] See flow sheet :   Rationale: increase ROM, increase strength and improve coordination to improve the patients ability to perform daily activities with decreased pain and symptom levels. 23 min Therapeutic Activity:  [x]  See flow sheet :   Rationale: increase ROM, increase strength and improve coordination  to improve the patients ability to perform daily activities with decreased pain and symptom levels. With   [] TE   [] TA   [] neuro   [] other: Patient Education: [x] Review HEP    [] Progressed/Changed HEP based on:   [] positioning   [] body mechanics   [] transfers   [] heat/ice application    [] other:      Other Objective/Functional Measures:   Slight compensation with DI flexion; pt uses UT to help. Pain Level (0-10 scale) post treatment: 0/10    ASSESSMENT/Changes in Function:     Talked with pt about her progress, and came to the mutual decision of decreasing her visits to 1x/week. Pt improving with D2 shoulder flexion exercise. No longer having pain with supine bilateral ER.     Patient will continue to benefit from skilled PT services to modify and progress therapeutic interventions, address functional mobility deficits, address ROM deficits, address strength deficits, analyze and address soft tissue restrictions, analyze and cue movement patterns, analyze and modify body mechanics/ergonomics, assess and modify postural abnormalities and instruct in home and community integration to attain remaining goals. [x]  See Plan of Care  []  See progress note/recertification  []  See Discharge Summary         Progress towards goals / Updated goals:  1.  Pt will be independent with HEP to encourage prophylaxis. Eval:Initiated this session. Current: Patient reports compliance with current program-MET      Long Term Goals: LTG- To be accomplished in 4 week(s):  1.  Pt will demonstrate ability to return to work full time and at full duty without increased left shoulder pain. Eval:Currently on light duty with pain increasing to 7/10 at the end of the day. Current: progressing- On full duty with pain of 3/10 at the end of the day.      2.  Pt will increase left shoulder strength to > 4+/5 in order to improve scapular stabilization during functional overhead activities. Eval:     L (1-5) R (1-5)   Flexors 4- 5   Abductors 4 5   External Rotators 4+ 5   Internal Rotators 4+ 5   Supraspinatus 4- 5   Serratus Anterior 4- 4   Lower Trapezius 3+ 4       Current: Left Lower trap= 3+      3.  Pt will increase pain free AROM in left shoulder to return to PLOF at work. Eval:     Left Right   Flexion 120 pain WNL   Scaption/ABD 95 pain WNL   ER @ 90 Degrees WNL WNL   IR @ 90 Degrees WNL WNL       Current: MET:Left lqhkdjw=555, no pain , Left scaption=160, no pain, but \"hard to do. \"      4.  Pt FOTO score will increase by >15 points to show improvement in subjective shoulder function.    Eval:59   Current: 71      PLAN  [x]  Upgrade activities as tolerated     [x]  Continue plan of care  []  Update interventions per flow sheet       []  Discharge due to:_  []  Other:_      Milan Monzon, CELESTINO  5/24/2018  5:31 PM    Future Appointments  Date Time Provider Leah Sánchez   5/29/2018 5:00 PM Celester Iftikhar PT, DPT [facility]   5/30/2018 5:00 PM MARTIN Gant [facility]   5/31/2018 5:00 PM Celester Iftikhar PT, DPT [facility]

## 2018-05-29 ENCOUNTER — APPOINTMENT (OUTPATIENT)
Dept: PHYSICAL THERAPY | Age: 33
End: 2018-05-29
Payer: OTHER MISCELLANEOUS

## 2018-05-30 ENCOUNTER — APPOINTMENT (OUTPATIENT)
Dept: PHYSICAL THERAPY | Age: 33
End: 2018-05-30
Payer: OTHER MISCELLANEOUS

## 2018-05-31 ENCOUNTER — APPOINTMENT (OUTPATIENT)
Dept: PHYSICAL THERAPY | Age: 33
End: 2018-05-31
Payer: OTHER MISCELLANEOUS

## 2018-06-21 ENCOUNTER — OFFICE VISIT (OUTPATIENT)
Dept: OBGYN CLINIC | Age: 33
End: 2018-06-21

## 2018-06-21 VITALS
DIASTOLIC BLOOD PRESSURE: 79 MMHG | BODY MASS INDEX: 35.21 KG/M2 | HEIGHT: 72 IN | HEART RATE: 77 BPM | WEIGHT: 260 LBS | SYSTOLIC BLOOD PRESSURE: 134 MMHG

## 2018-06-21 DIAGNOSIS — N92.6 MISSED MENSES: Primary | ICD-10-CM

## 2018-06-21 NOTE — PROGRESS NOTES
28 y/o  presents to the office for missed menses. She has no major concerns. Notes mild nausea without vomiting. Denies any pain or bleeding. Concerned about possible twins. Visit Vitals    /79    Pulse 77    Ht 6' 3\" (1.905 m)    Wt 260 lb (117.9 kg)    LMP 2018 (Exact Date)    BMI 32.5 kg/m2     Exam: see full ultrsound report    A/P:  Early IUP-confirmed @ 8w2d. Final GEOVANNI c/w LMP minus 3 days. Final GEOVANNI- 19  Encouraged centering in pregnancy.

## 2018-07-03 ENCOUNTER — HOSPITAL ENCOUNTER (OUTPATIENT)
Dept: LAB | Age: 33
Discharge: HOME | End: 2018-07-03
Payer: COMMERCIAL

## 2018-07-03 ENCOUNTER — ROUTINE PRENATAL (OUTPATIENT)
Dept: OBGYN CLINIC | Age: 33
End: 2018-07-03

## 2018-07-03 VITALS
WEIGHT: 261 LBS | HEART RATE: 80 BPM | SYSTOLIC BLOOD PRESSURE: 130 MMHG | HEIGHT: 72 IN | BODY MASS INDEX: 35.35 KG/M2 | DIASTOLIC BLOOD PRESSURE: 85 MMHG

## 2018-07-03 DIAGNOSIS — Z34.90 PREGNANCY, UNSPECIFIED GESTATIONAL AGE: ICD-10-CM

## 2018-07-03 DIAGNOSIS — Z34.81 ENCOUNTER FOR SUPERVISION OF OTHER NORMAL PREGNANCY IN FIRST TRIMESTER: ICD-10-CM

## 2018-07-03 DIAGNOSIS — Z3A.10 10 WEEKS GESTATION OF PREGNANCY: ICD-10-CM

## 2018-07-03 DIAGNOSIS — Z3A.10 10 WEEKS GESTATION OF PREGNANCY: Primary | ICD-10-CM

## 2018-07-03 LAB
ABO + RH BLD: NORMAL
BASOPHILS # BLD: 0.1 K/UL (ref 0–0.06)
BASOPHILS NFR BLD: 1 % (ref 0–2)
BLOOD GROUP ANTIBODIES SERPL: NORMAL
DIFFERENTIAL METHOD BLD: ABNORMAL
EOSINOPHIL # BLD: 0.1 K/UL (ref 0–0.4)
EOSINOPHIL NFR BLD: 1 % (ref 0–5)
ERYTHROCYTE [DISTWIDTH] IN BLOOD BY AUTOMATED COUNT: 13.5 % (ref 11.6–14.5)
HBSAG, EXTERNAL: NEGATIVE
HCT VFR BLD AUTO: 37.9 % (ref 35–45)
HGB BLD-MCNC: 12.6 G/DL (ref 12–16)
HIV, EXTERNAL: NORMAL
LYMPHOCYTES # BLD: 1.6 K/UL (ref 0.9–3.6)
LYMPHOCYTES NFR BLD: 17 % (ref 21–52)
MCH RBC QN AUTO: 28.7 PG (ref 24–34)
MCHC RBC AUTO-ENTMCNC: 33.2 G/DL (ref 31–37)
MCV RBC AUTO: 86.3 FL (ref 74–97)
MONOCYTES # BLD: 0.8 K/UL (ref 0.05–1.2)
MONOCYTES NFR BLD: 8 % (ref 3–10)
NEUTS SEG # BLD: 7.1 K/UL (ref 1.8–8)
NEUTS SEG NFR BLD: 73 % (ref 40–73)
PLATELET # BLD AUTO: 289 K/UL (ref 135–420)
PMV BLD AUTO: 11.5 FL (ref 9.2–11.8)
RBC # BLD AUTO: 4.39 M/UL (ref 4.2–5.3)
RUBELLA, EXTERNAL: NORMAL
RUBV IGG SER-IMP: NORMAL
SPECIMEN EXP DATE BLD: NORMAL
T PALLIDUM AB SER QL IA: NONREACTIVE
TYPE, ABO & RH, EXTERNAL: NORMAL
WBC # BLD AUTO: 9.6 K/UL (ref 4.6–13.2)

## 2018-07-03 PROCEDURE — 83021 HEMOGLOBIN CHROMOTOGRAPHY: CPT | Performed by: OBSTETRICS & GYNECOLOGY

## 2018-07-03 PROCEDURE — 86780 TREPONEMA PALLIDUM: CPT | Performed by: OBSTETRICS & GYNECOLOGY

## 2018-07-03 PROCEDURE — 36415 COLL VENOUS BLD VENIPUNCTURE: CPT | Performed by: OBSTETRICS & GYNECOLOGY

## 2018-07-03 PROCEDURE — 87491 CHLMYD TRACH DNA AMP PROBE: CPT | Performed by: OBSTETRICS & GYNECOLOGY

## 2018-07-03 PROCEDURE — 87086 URINE CULTURE/COLONY COUNT: CPT | Performed by: OBSTETRICS & GYNECOLOGY

## 2018-07-03 PROCEDURE — 86762 RUBELLA ANTIBODY: CPT | Performed by: OBSTETRICS & GYNECOLOGY

## 2018-07-03 PROCEDURE — 86900 BLOOD TYPING SEROLOGIC ABO: CPT | Performed by: OBSTETRICS & GYNECOLOGY

## 2018-07-03 PROCEDURE — 85025 COMPLETE CBC W/AUTO DIFF WBC: CPT | Performed by: OBSTETRICS & GYNECOLOGY

## 2018-07-03 PROCEDURE — 87340 HEPATITIS B SURFACE AG IA: CPT | Performed by: OBSTETRICS & GYNECOLOGY

## 2018-07-03 PROCEDURE — 87389 HIV-1 AG W/HIV-1&-2 AB AG IA: CPT | Performed by: OBSTETRICS & GYNECOLOGY

## 2018-07-03 NOTE — MR AVS SNAPSHOT
[facility] 
 
 
 [address] [facility] [address] [phone] 
[phone] Patient: Deana Berg MRN: [id number] :1985 Visit Information Date & Time Provider Department Dept. Phone Encounter #  
 7/3/2018  8:30 AM Merary Whelan MD [facility] [phone] [id number]  
  
 2018  9:30 AM  
OB VISIT with Merary Whelan MD  
[address] [place] [address] ([facility]) Appt Note: OB  
 [address] [facility] [address] [phone]  
[phone]  
  
   
 [address] [facility] [address] [phone] Upcoming Health Maintenance Date Due Influenza Age 5 to Adult 2018 PAP AKA CERVICAL CYTOLOGY 2020 Allergies as of 7/3/2018  Review Complete On: 2017 By: Merary Whelan MD  
 No Known Allergies Current Immunizations  Reviewed on 2016 Name Date Tdap 2016  4:10 PM  
  
 Not reviewed this visit You Were Diagnosed With   
  
 Codes Comments Pregnancy, unspecified gestational age    -  Primary ICD-10-CM: Z34.90 ICD-9-CM: V22.2 Vitals BP Pulse Height(growth percentile) Weight(growth percentile) LMP BMI  
 130/85 80 6' 3\" (1.905 m) 261 lb (118.4 kg) 2018 (Exact Date) 32.62 kg/m2 OB Status Smoking Status Pregnant Never Smoker Vitals History BMI and BSA Data Body Mass Index Body Surface Area  
 32.62 kg/m 2 2.5 m 2 Preferred Pharmacy [facility], [address] [place] [address] [phone] Your Updated Medication List  
  
   
This list is accurate as of 7/3/18  9:51 AM.  Always use your most recent med list. AMBULATORY BREAST PUMP Double electric breast pump of patient's choice. Use as directed for breast feeding. calcium carbonate 200 mg calcium (500 mg) Chew Commonly known as:  TUMS Take 1 Tab by mouth daily. ibuprofen 800 mg tablet Commonly known as:  MOTRIN  
 Take 1 Tab by mouth every eight (8) hours as needed. norethindrone 0.35 mg Tab Commonly known as:  Law & Law Take 1 Tab by mouth daily. PRENATAL DHA+COMPLETE PRENATAL -300 mg-mcg-mg Cmpk Generic drug:  [id number] tina-folic-dha Take  by mouth. senna-docusate 8.6-50 mg per tablet Commonly known as:  Marrisadi Estefani Take 1 Tab by mouth two (2) times daily as needed for Constipation. To-Do List   
 07/03/2018 Microbiology:  CULTURE, URINE Introducing [facility] & HEALTH SERVICES! [employer] introduces [employer] Point patient portal. Now you can access parts of your medical record, email your doctor's office, and request medication refills online. 1. In your internet browser, go to [email or web address] 2. Click on the First Time User? Click Here link in the Sign In box. You will see the New Member Sign Up page. 3. Enter your Pro[employer] Point Access Code exactly as it appears below. You will not need to use this code after youve completed the sign-up process. If you do not sign up before the expiration date, you must request a new code. · Pro[employer] Point Access Code: [email or web address] Expires: 8/7/2018 12:33 PM 
 
4. Enter the last four digits of your Social Security Number (xxxx) and Date of Birth (mm/dd/yyyy) as indicated and click Submit. You will be taken to the next sign-up page. 5. Create a Profit Point ID. This will be your Profit Point login ID and cannot be changed, so think of one that is secure and easy to remember. 6. Create a Pro[employer] Point password. You can change your password at any time. 7. Enter your Password Reset Question and Answer. This can be used at a later time if you forget your password. 8. Enter your e-mail address. You will receive e-mail notification when new information is available in [address] 9. Click Sign Up. You can now view and download portions of your medical record.  
10. Click the Download Summary menu link to download a portable copy of your medical information. If you have questions, please visit the Frequently Asked Questions section of the motionID technologies website. Remember, [employer]ID technologies is NOT to be used for urgent needs. For medical emergencies, dial 911. Now available from your iPhone and Android! Please provide this summary of care documentation to your next provider. Your primary care clinician is listed as NONE. If you have any questions after today's visit, please call [phone].

## 2018-07-03 NOTE — PROGRESS NOTES
Anali Chavez    Ms. Catherine Samuels presents today for her first prenatal visit. She has no concerns today. OB History      Para Term  AB Living    2         SAB TAB Ectopic Molar Multiple Live Births                 I have reviewed the patient's medical, obstetrical, social, and family histories, medications, and available lab results. Subjective:   She has no unusual complaints    Objective:   Initial Physical Exam (New OB)    GENERAL APPEARANCE: alert, well appearing, in no apparent distress  HEAD: normocephalic, atraumatic  MOUTH: mucous membranes moist, pharynx normal without lesions  THYROID: no thyromegaly or masses present  BREASTS: no masses noted, no significant tenderness, no palpable axillary nodes, no skin changes  LUNGS: clear to auscultation, no wheezes, rales or rhonchi, symmetric air entry  HEART: regular rate and rhythm, no murmurs  ABDOMEN: soft, nontender, nondistended, no abnormal masses, no epigastric pain and FHT present  EXTREMITIES: no redness or tenderness in the calves or thighs, no edema  SKIN: normal coloration and turgor, no rashes  LYMPH NODES: no adenopathy palpable  NEUROLOGIC: alert, oriented, normal speech, no focal findings or movement disorder noted    PELVIC EXAM: EXTERNAL GENITALIA: normal appearing vulva with no masses, tenderness or lesions  VAGINA: no abnormal discharge or lesions  CERVIX: no lesions or cervical motion tenderness  UTERUS: gravid and consistent with 10 weeks  ADNEXA: no masses palpable and nontender  OB EXAM PELVIMETRY: appears adequate    Assessment/Plan:   Normal pregnancy    Routine Prenatal care    ICD-10-CM ICD-9-CM    1. 10 weeks gestation of pregnancy Z3A.10 V22.2 CULTURE, URINE      HEMOGLOBIN FRACTIONATION      RPR      RUBELLA AB, IGG      CBC WITH AUTOMATED DIFF      HEP B SURFACE AG      HIV 1/2 AG/AB, 4TH GENERATION,W RFLX CONFIRM      TYPE & SCREEN      CHLAMYDIA/NEISSERIA/TRICHOMONAS AMP   2.  Encounter for supervision of other normal pregnancy in first trimester Z34.81 V22.1     Declined genetics. Encouraged Centering. Works as a PT in the hospital. Concerned about time commitment. RTO 4 weeks.

## 2018-07-04 LAB
HBV SURFACE AG SER QL: 0.31 INDEX
HBV SURFACE AG SER QL: NEGATIVE
HIV 1+2 AB+HIV1 P24 AG SERPL QL IA: NONREACTIVE
HIV12 RESULT COMMENT, HHIVC: NORMAL

## 2018-07-05 LAB
BACTERIA SPEC CULT: ABNORMAL
BACTERIA SPEC CULT: ABNORMAL
C TRACH RRNA SPEC QL NAA+PROBE: NEGATIVE
DEPRECATED HGB OTHER BLD-IMP: 0 %
HGB A MFR BLD: 97.4 % (ref 96.4–98.8)
HGB A2 MFR BLD COLUMN CHROM: 2.6 % (ref 1.8–3.2)
HGB C MFR BLD: 0 %
HGB F MFR BLD: 0 % (ref 0–2)
HGB FRACT BLD-IMP: NORMAL
HGB S BLD QL SOLY: NEGATIVE
HGB S MFR BLD: 0 %
N GONORRHOEA RRNA SPEC QL NAA+PROBE: NEGATIVE
SERVICE CMNT-IMP: ABNORMAL
SPECIMEN SOURCE: NORMAL
T VAGINALIS RRNA SPEC QL NAA+PROBE: NEGATIVE

## 2018-07-12 ENCOUNTER — TELEPHONE (OUTPATIENT)
Dept: OBGYN CLINIC | Age: 33
End: 2018-07-12

## 2018-07-12 NOTE — TELEPHONE ENCOUNTER
Patient called to discuss labs , patient aware of urine culture, patient instructed to increase fluids and lab will be repeated on next visit

## 2018-08-01 ENCOUNTER — ROUTINE PRENATAL (OUTPATIENT)
Dept: OBGYN CLINIC | Age: 33
End: 2018-08-01

## 2018-08-01 DIAGNOSIS — R82.89 ABNORMAL URINE CYTOLOGY: Primary | ICD-10-CM

## 2018-08-01 NOTE — MR AVS SNAPSHOT
[address] [facility] [address] [phone] 
[phone] Patient: Javi Pereyra MRN: [id number] :1985 Visit Information Date & Time Provider Department Dept. Phone Encounter #  
 2018  9:30 AM Kelsey Smith [facility] [id number] [id number] Follow-up Instructions Return in about 4 weeks (around 2018). Upcoming Health Maintenance Date Due Influenza Age 5 to Adult 2018 PAP AKA CERVICAL CYTOLOGY 2020 Allergies as of 2018  Review Complete On: 2017 By: Kelsey Smith MD  
 No Known Allergies Current Immunizations  Reviewed on 2016 Name Date Tdap 2016  4:10 PM  
  
 Not reviewed this visit You Were Diagnosed With   
  
 Codes Comments Abnormal urine cytology    -  Primary ICD-10-CM: R82.8 ICD-9-CM: 791.7 Vitals LMP OB Status Smoking Status 2018 (Exact Date) Pregnant Never Smoker Preferred Pharmacy [facility]acy Name [facility], [address] [place] [address] [phone] Your Updated Medication List  
  
   
This list is accurate as of 18 10:12 AM.  Always use your most recent med list. AMBULATORY BREAST PUMP Double electric breast pump of patient's choice. Use as directed for breast feeding. calcium carbonate 200 mg calcium (500 mg) Chew Commonly known as:  TUMS Take 1 Tab by mouth daily. ibuprofen 800 mg tablet Commonly known as:  MOTRIN Take 1 Tab by mouth every eight (8) hours as needed. norethindrone 0.35 mg Tab Commonly known as:  Law & Law Take 1 Tab by mouth daily. PRENATAL DHA+COMPLETE PRENATAL -300 mg-mcg-mg Cmpk Generic drug:  [id number] tina-folic-dha Take  by mouth. senna-docusate 8.6-50 mg per tablet Commonly known as:  Ulysess Caras Take 1 Tab by mouth two (2) times daily as needed for Constipation. Follow-up Instructions Return in about 4 weeks (around 8/29/2018). To-Do List   
 08/01/2018 Microbiology:  CULTURE, URINE Patient Instructions Weeks 14 to 18 of Your Pregnancy: Care Instructions Your Care Instructions During this time, you may start to \"show,\" so that you look pregnant to people around you. You may also notice some changes in your skin, such as itchy spots on your palms or acne on your face. Your baby is now able to pass urine, and your baby's first stool (meconium) is starting to collect in his or her intestines. Hair is also beginning to grow on your baby's head. At your next visit, between weeks 18 and 20, your doctor may do an ultrasound test. The test allows your doctor to check for certain problems. Your doctor can also tell the sex of your baby. This is a good time to think about whether you want to know whether your baby is a boy or a girl. Talk to your doctor about getting a flu shot to help keep you healthy during your pregnancy. As your pregnancy moves along, it is common to worry or feel anxious. Your body is changing a lot. And you are thinking about giving birth, the health of your baby, and becoming a parent. You can learn to cope with any anxiety and stress you feel. Follow-up care is a key part of your treatment and safety. Be sure to make and go to all appointments, and call your doctor if you are having problems. It's also a good idea to know your test results and keep a list of the medicines you take. How can you care for yourself at home? 
 Reduce stress 
  · Ask for help with cooking and housekeeping.  
  · Figure out who or what causes your stress. Avoid these people or situations as much as possible.  
  · Relax every day. Taking 10- to 15-minute breaks can make a big difference. Take a walk, listen to music, or take a warm bath.  
  · Learn relaxation techniques at prenatal or yoga class. Or buy a relaxation tape.   · List your fears about having a baby and becoming a parent. Share the list with someone you trust. Decide which worries are really small, and try to let them go. Exercise 
  · If you did not exercise much before pregnancy, start slowly. Walking is best. [employer], and do a little more every day.  
  · Brisk walking, easy jogging, low-impact aerobics, water aerobics, and yoga are good choices. Some sports, such as scuba diving, horseback riding, downhill skiing, gymnastics, and water skiing, are not a good idea.  
  · Try to do at least 2½ hours a week of moderate exercise, such as a fast walk. One way to do this is to be active 30 minutes a day, at least 5 days a week. It's fine to be active in blocks of 10 minutes or more throughout your day and week.  
  · Wear loose clothing. And wear shoes and a bra that provide good support.  
  · Warm up and cool down to start and finish your exercise.  
  · If you want to use weights, be sure to use light weights. They reduce stress on your joints.  
 Stay at the best weight for you 
  · Experts recommend that you gain about 1 pound a month during the first 3 months of your pregnancy.  
  · Experts recommend that you gain about 1 pound a week during your last 6 months of pregnancy, for a total weight gain of 25 to 35 pounds.  
  · If you are underweight, you will need to gain more weight (about 28 to 40 pounds).  
  · If you are overweight, you may not need to gain as much weight (about 15 to 25 pounds).  
  · If you are gaining weight too fast, use common sense. Exercise every day, and limit sweets, fast foods, and fats. Choose lean meats, fruits, and vegetables.  
  · If you are having twins or more, your doctor may refer you to a dietitian. Where can you learn more? Go to http://damion-ashish.info/. Enter U673 in the search box to learn more about \"Weeks 14 to 18 of Your Pregnancy: Care Instructions. \" Current as of: November 21, 2017 Content Version: 11.7 © 2252-9105 [employer]. Care instructions adapted under license by [employer] (which disclaims liability or warranty for this information). If you have questions about a medical condition or this instruction, always ask your healthcare professional. [address] any warranty or liability for your use of this information. Introducing [facility] & HEALTH SERVICES! Lucina Casas introduces Edge[employer]X patient portal. Now you can access parts of your medical record, email your doctor's office, and request medication refills online. 1. In your internet browser, go to [email or web address] 2. Click on the First Time User? Click Here link in the Sign In box. You will see the New Member Sign Up page. 3. Enter your Edge[employer]X Access Code exactly as it appears below. You will not need to use this code after youve completed the sign-up process. If you do not sign up before the expiration date, you must request a new code. · Edge[employer] Access Code: [email or web address] Expires: 8/7/2018 12:33 PM 
 
4. Enter the last four digits of your Social Security Number (xxxx) and Date of Birth (mm/dd/yyyy) as indicated and click Submit. You will be taken to the next sign-up page. 5. Create a EdgeConneX ID. This will be your EdgeConneX login ID and cannot be changed, so think of one that is secure and easy to remember. 6. Create a EdgeConneX password. You can change your password at any time. 7. Enter your Password Reset Question and Answer. This can be used at a later time if you forget your password. 8. Enter your e-mail address. You will receive e-mail notification when new information is available in [address] 9. Click Sign Up. You can now view and download portions of your medical record. 10. Click the Download Summary menu link to download a portable copy of your medical information. If you have questions, please visit the Frequently Asked Questions section of the [employer] website. Remember, [employer] is NOT to be used for urgent needs. For medical emergencies, dial 911. Now available from your iPhone and Android! Please provide this summary of care documentation to your next provider. Your primary care clinician is listed as NONE. If you have any questions after today's visit, please call [phone].

## 2018-08-01 NOTE — PATIENT INSTRUCTIONS

## 2018-08-02 VITALS
HEART RATE: 98 BPM | BODY MASS INDEX: 35.49 KG/M2 | SYSTOLIC BLOOD PRESSURE: 113 MMHG | WEIGHT: 262 LBS | HEIGHT: 72 IN | DIASTOLIC BLOOD PRESSURE: 69 MMHG

## 2018-08-29 ENCOUNTER — ROUTINE PRENATAL (OUTPATIENT)
Dept: OBGYN CLINIC | Age: 33
End: 2018-08-29

## 2018-08-29 VITALS
DIASTOLIC BLOOD PRESSURE: 77 MMHG | HEART RATE: 79 BPM | HEIGHT: 72 IN | SYSTOLIC BLOOD PRESSURE: 113 MMHG | BODY MASS INDEX: 35.62 KG/M2 | WEIGHT: 263 LBS

## 2018-08-29 DIAGNOSIS — Z3A.18 PREGNANCY WITH 18 COMPLETED WEEKS GESTATION: Primary | ICD-10-CM

## 2018-08-29 NOTE — PROGRESS NOTES
18w5d. No OB issues. 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 Denies pain or bleeding. Declines genetics. Morph in 1-2 weeks. RTO 4 weeks.

## 2018-08-29 NOTE — PATIENT INSTRUCTIONS

## 2018-09-26 ENCOUNTER — ROUTINE PRENATAL (OUTPATIENT)
Dept: OBGYN CLINIC | Age: 33
End: 2018-09-26

## 2018-09-26 VITALS
HEART RATE: 83 BPM | SYSTOLIC BLOOD PRESSURE: 116 MMHG | HEIGHT: 72 IN | BODY MASS INDEX: 36.84 KG/M2 | WEIGHT: 272 LBS | DIASTOLIC BLOOD PRESSURE: 77 MMHG

## 2018-09-26 DIAGNOSIS — Z3A.22 PREGNANCY WITH 22 COMPLETED WEEKS GESTATION: Primary | ICD-10-CM

## 2018-09-26 NOTE — PATIENT INSTRUCTIONS

## 2018-09-26 NOTE — PROGRESS NOTES
22w5d.  No OB issues. Normal FM. Denies pain or bleeidng. Noted 9lb weight gain. Long discussion about excessive weight in pregnancy and dietary choices. Morph reviewed- normal female. RTO 4 weeks.

## 2018-10-15 ENCOUNTER — HOSPITAL ENCOUNTER (EMERGENCY)
Age: 33
Discharge: HOME | End: 2018-10-15
Attending: OBSTETRICS & GYNECOLOGY | Admitting: OBSTETRICS & GYNECOLOGY
Payer: COMMERCIAL

## 2018-10-15 ENCOUNTER — DOCUMENTATION ONLY (OUTPATIENT)
Dept: OBGYN CLINIC | Age: 33
End: 2018-10-15

## 2018-10-15 ENCOUNTER — TELEPHONE (OUTPATIENT)
Dept: OBGYN CLINIC | Age: 33
End: 2018-10-15

## 2018-10-15 VITALS
TEMPERATURE: 98.4 F | OXYGEN SATURATION: 100 % | HEART RATE: 72 BPM | SYSTOLIC BLOOD PRESSURE: 129 MMHG | DIASTOLIC BLOOD PRESSURE: 68 MMHG | RESPIRATION RATE: 20 BRPM

## 2018-10-15 DIAGNOSIS — Z3A.25 25 WEEKS GESTATION OF PREGNANCY: ICD-10-CM

## 2018-10-15 DIAGNOSIS — O36.8120 DECREASED FETAL MOVEMENTS IN SECOND TRIMESTER, SINGLE OR UNSPECIFIED FETUS: ICD-10-CM

## 2018-10-15 PROBLEM — Z34.90 PREGNANCY: Status: ACTIVE | Noted: 2018-10-15

## 2018-10-15 PROCEDURE — 99283 EMERGENCY DEPT VISIT LOW MDM: CPT

## 2018-10-15 PROCEDURE — 59025 FETAL NON-STRESS TEST: CPT

## 2018-10-15 NOTE — PROGRESS NOTES
LABOR AND DELIVERY TRIAGE- Non stress test 
 
Patient presents to L&D Triage for NST Indication: Decreased fetal movemetn Presenting symptoms and initial assessment discussed with RN caring for the patient. Remote EFM reviewed:  Baseline 140s with accelerations to 160's. Monitored for 20 minutes [place]:  Quiet. No cervical change on 2 exams. A/P: 
NST reactive Reassuring fetal status. Disposition: Home Kick counts Beck Salazar MD 
10/15/2018 11:50 AM

## 2018-10-15 NOTE — PROGRESS NOTES
253/7 wks   Pt arrived crying stating that she hasnt felt strong baby movements  X 3 days  She denies pain,vag bleeding or  rom  efm applied  fht 150s  Neg prenatal hx   
1125  Pt feeling kicks now and is relieved with hearing fht  
1144  Dr Alberto Lau given report on pt concerns and category 1 tracing    20 min strip run  Order for disch received 1150  Prenatal precautions and kick counts reviewed  Time for qustions given 80  Pt dcd  Ambulatory

## 2018-10-15 NOTE — PROGRESS NOTES
Pt called with complaint of decrease fetal movement x 3 days and notes stomach doesn't feel right. Pt sent to l&d per Dr. Jayson Snellen. Pt verbal understanding.

## 2018-10-15 NOTE — IP AVS SNAPSHOT
Clive Pock 
 
 
 [address] Patient: Remigio Juan MRN: [id number] :1985 About your hospitalization You were admitted on:  N/A You last received care in the:  [address] [place] [address] You were discharged on:  October 15, 2018 Why you were hospitalized Your primary diagnosis was:  Not on File Your diagnoses also included:  Pregnancy Follow-up Information None Your Scheduled Appointments 2018  9:30 AM EDT  
OB VISIT with Andreina Avila MD  
[facility] ([facility]) [address] [place], [place] [address]y [facility] [phone]  
[phone] Discharge Orders None A check jodie indicates which time of day the medication should be taken. My Medications ASK your doctor about these medications Instructions Each Dose to Equal  
 Morning Noon Evening Bedtime AMBULATORY BREAST PUMP Your last dose was: Your next dose is:    
   
   
 Double electric breast pump of patient's choice. Use as directed for breast feeding. calcium carbonate 200 mg calcium (500 mg) Chew Commonly known as:  TUMS Your last dose was: Your next dose is: Take 1 Tab by mouth daily. 1 Tab  
    
   
   
   
  
 ibuprofen 800 mg tablet Commonly known as:  MOTRIN Your last dose was: Your next dose is: Take 1 Tab by mouth every eight (8) hours as needed. 800 mg  
    
   
   
   
  
 norethindrone 0.35 mg Tab Commonly known as:  Law & Law Your last dose was: Your next dose is: Take 1 Tab by mouth daily. 1 Tab PRENATAL DHA+COMPLETE PRENATAL 305-300 mg-mcg-mg Cmpk Generic drug:  [id number] tina-folic-dha Your last dose was: Your next dose is: Take  by mouth. senna-docusate 8.6-50 mg per tablet Commonly known as:  Keturah Leticia Your last dose was: Your next dose is: Take 1 Tab by mouth two (2) times daily as needed for Constipation. 1 Tab Discharge Instructions None Introducing [facility]! Dear Natividad Spurling: 
Thank you for requesting a [employer] account. Our records indicate that you already have an active [employer] account. You can access your account anytime at [email or web address] Did you know that you can access your hospital and ER discharge instructions at any time in [employer]? You can also review all of your test results from your hospital stay or ER visit. Additional Information If you have questions, please visit the Frequently Asked Questions section of the [employer] website at [email or web address] Remember, [employer] is NOT to be used for urgent needs. For medical emergencies, dial 911. Now available from your iPhone and Android! Introducing Manuel Davis As a [facility] patient, I wanted to make you aware of our electronic visit tool called Manuel Davis. [facility] 24/7 allows you to connect within minutes with a medical provider 24 hours a day, seven days a week via a mobile device or tablet or logging into a secure website from your computer. You can access Manuel Davis from anywhere in the [place]. A virtual visit might be right for you when you have a simple condition and feel like you just dont want to get out of bed, or cant get away from work for an appointment, when your regular [facility] provider is not available (evenings, weekends or holidays), or when youre out of town and need minor care. Electronic visits cost only $49 and if the [facility] 24/7 provider determines a prescription is needed to treat your condition, one can be electronically transmitted to a nearby pharmacy*. Please take a moment to enroll today if you have not already done so. The enrollment process is free and takes just a few minutes. To enroll, please download the [facility] 24/7 cathy to your tablet or phone, or visit [email or web address] to enroll on your computer. And, as an [address] [place] [address] patient with a [employer] account, the results of your visits will be scanned into your electronic medical record and your primary care provider will be able to view the scanned results. We urge you to continue to see your regular [facility] provider for your ongoing medical care. And while your primary care provider may not be the one available when you seek a [facility] virtual visit, the peace of mind you get from getting a real diagnosis real time can be priceless. For more information on [employer]ine[employer], view our Frequently Asked Questions (FAQs) at [email or web address]. Sincerely, 
 
Stephania Aguayo MD 
Chief Medical Officer [address] [place] [address] *:  certain medications cannot be prescribed via [facility] Providers Seen During Your Hospitalization Provider Specialty Primary office phone Mckayla Cerrato MD Obstetrics & Gynecology [phone] Your Primary Care Physician (PCP) Primary Care Physician Office Phone Office Fax NONE ** None ** ** None ** You are allergic to the following No active allergies Recent Documentation OB Status Smoking Status Pregnant Never Smoker Emergency Contacts Name Discharge Info Relation Home Work Mobile AgHerrera DISCHARGE CAREGIVER [3] Spouse [3] [phone] Patient Belongings The following personal items are in your possession at time of discharge: 
                             
 
  
  
Discharge Instructions Attachments/References PREGNANCY: KICK COUNTS (ENGLISH) PREGNANCY: WEEKS 26 TO 30 (ENGLISH) PREGNANCY: PRECAUTIONS (ENGLISH) Patient Handouts Counting Your Baby's Kicks: Care Instructions Your Care Instructions Counting your baby's kicks is one way your doctor can tell that your baby is healthy. Most womenespecially in a first pregnancyfeel their baby move for the first time between 16 and 22 weeks. The movement may feel like flutters rather than kicks. Your baby may move more at certain times of the day. When you are active, you may notice less kicking than when you are resting. At your prenatal visits, your doctor will ask whether the baby is active. In your last trimester, your doctor may ask you to count the number of times you feel your baby move. Follow-up care is a key part of your treatment and safety. Be sure to make and go to all appointments, and call your doctor if you are having problems. It's also a good idea to know your test results and keep a list of the medicines you take. How do you count fetal kicks? · A common method of checking your baby's movement is to count the number of kicks or moves you feel in 1 hour. Ten movements (such as kicks, flutters, or rolls) in 1 hour are normal. Some doctors suggest that you count in the morning until you get to 10 movements. Then you can quit for that day and start again the next day. · Pick your baby's most active time of day to count. This may be any time from morning to evening. · If you do not feel 10 movements in an hour, your baby may be sleeping. Wait for the next hour and count again. When should you call for help? Call your doctor now or seek immediate medical care if: 
  · You noticed that your baby has stopped moving or is moving much less than normal.  
 Watch closely for changes in your health, and be sure to contact your doctor if you have any problems. Where can you learn more? Go to http://damion-ashish.info/.  
Enter N016 in the search box to learn more about \"Counting Your [de-identified] Kicks: Care Instructions. \" Current as of: 2017 Content Version: 11.8 © 7636-9262 Bunchball. Care instructions adapted under license by [employer] (which disclaims liability or warranty for this information). If you have questions about a medical condition or this instruction, always ask your healthcare professional. [address] any warranty or liability for your use of this information. Weeks 26 to 30 of Your Pregnancy: Care Instructions Your Care Instructions You are now in your last trimester of pregnancy. Your baby is growing rapidly. And you'll probably feel your baby moving around more often. Your doctor may ask you to count your baby's kicks. Your back may ache as your body gets used to your baby's size and length. If you haven't already had the Tdap shot during this pregnancy, talk to your doctor about getting it. It will help protect your  against pertussis infection. During this time, it's important to take care of yourself and pay attention to what your body needs. If you feel sexual, explore ways to be close with your partner that match your comfort and desire. Use the tips provided in this care sheet to find ways to be sexual in your own way. Follow-up care is a key part of your treatment and safety. Be sure to make and go to all appointments, and call your doctor if you are having problems. It's also a good idea to know your test results and keep a list of the medicines you take. How can you care for yourself at home? Take it easy at work · Take frequent breaks. If possible, stop working when you are tired, and rest during your lunch hour. · Take bathroom breaks every 2 hours. · Change positions often. If you sit for long periods, stand up and walk around. · When you stand for a long time, keep one foot on a low stool with your knee bent. After standing a lot, sit with your feet up. · Avoid fumes, chemicals, and tobacco smoke. Be sexual in your own way · Having sex during pregnancy is okay, unless your doctor tells you not to. · You may be very interested in sex, or you may have no interest at all. · Your growing belly can make it hard to find a good position during intercourse. Pentress and explore. · You may get cramps in your uterus when your partner touches your breasts. · A back rub may relieve the backache or cramps that sometimes follow orgasm. Learn about  labor · Watch for signs of  labor. You may be going into labor if: 
¨ You have menstrual-like cramps, with or without nausea. ¨ You have about 6 or more contractions in 1 hour, even after you have had a glass of water and are resting. ¨ You have a low, dull backache that does not go away when you change your position. ¨ You have pain or pressure in your pelvis that comes and goes in a pattern. ¨ You have intestinal cramping or flu-like symptoms, with or without diarrhea. ¨ You notice an increase or change in your vaginal discharge. Discharge may be heavy, mucus-like, watery, or streaked with blood. ¨ Your water breaks. · If you think you have  labor: ¨ Drink 2 or 3 glasses of water or juice. Not drinking enough fluids can cause contractions. ¨ Stop what you are doing, and empty your bladder. Then lie down on your left side for at least 1 hour. ¨ While lying on your side, find your breast bone. Put your fingers in the soft spot just below it. Move your fingers down toward your belly button to find the top of your uterus. Check to see if it is tight. ¨ Contractions can be weak or strong. Record your contractions for an hour. Time a contraction from the start of one contraction to the start of the next one. ¨ Single or several strong contractions without a pattern are called [place]-Sood contractions.  They are practice contractions but not the start of labor. They often stop if you change what you are doing. ¨ Call your doctor if you have regular contractions. Where can you learn more? Go to http://damion-ashish.info/. Enter J756 in the search box to learn more about \"Weeks 26 to 30 of Your Pregnancy: Care Instructions. \" Current as of: 2017 Content Version: 11.8 © 0946-6440 Jeeran. Care instructions adapted under license by [employer] (which disclaims liability or warranty for this information). If you have questions about a medical condition or this instruction, always ask your healthcare professional. [place] [address] any warranty or liability for your use of this information. Pregnancy Precautions: Care Instructions Your Care Instructions There is no sure way to prevent labor before your due date ( labor) or to prevent most other pregnancy problems. But there are things you can do to increase your chances of a healthy pregnancy. Go to your appointments, follow your doctor's advice, and take good care of yourself. Eat well, and exercise (if your doctor agrees). And make sure to drink plenty of water. Follow-up care is a key part of your treatment and safety. Be sure to make and go to all appointments, and call your doctor if you are having problems. It's also a good idea to know your test results and keep a list of the medicines you take. How can you care for yourself at home? · Make sure you go to your prenatal appointments. At each visit, your doctor will check your blood pressure. Your doctor will also check to see if you have protein in your urine. High blood pressure and protein in urine are signs of preeclampsia. This condition can be dangerous for you and your baby. · Drink plenty of fluids, enough so that your urine is light yellow or clear like water. Dehydration can cause contractions.  If you have kidney, heart, or liver disease and have to limit fluids, talk with your doctor before you increase the amount of fluids you drink. · Tell your doctor right away if you notice any symptoms of an infection, such as: ¨ Burning when you urinate. ¨ A foul-smelling discharge from your vagina. ¨ Vaginal itching. ¨ Unexplained fever. ¨ Unusual pain or soreness in your uterus or lower belly. · Eat a balanced diet. Include plenty of foods that are high in calcium and iron. ¨ Foods high in calcium include milk, cheese, yogurt, almonds, and broccoli. ¨ Foods high in iron include red meat, shellfish, poultry, eggs, beans, raisins, whole-grain bread, and leafy green vegetables. · Do not smoke. If you need help quitting, talk to your doctor about stop-smoking programs and medicines. These can increase your chances of quitting for good. · Do not drink alcohol or use illegal drugs. · Follow your doctor's directions about activity. Your doctor will let you know how much, if any, exercise you can do. · Ask your doctor if you can have sex. If you are at risk for early labor, your doctor may ask you to not have sex. · Take care to prevent falls. During pregnancy, your joints are loose, and your balance is off. Sports such as bicycling, skiing, or in-line skating can increase your risk of falling. And don't ride horses or motorcycles, dive, water ski, scuba dive, or parachute jump while you are pregnant. · Avoid getting very hot. Do not use saunas or hot tubs. Avoid staying out in the sun in hot weather for long periods. Take acetaminophen (Tylenol) to lower a high fever. · Do not take any over-the-counter or herbal medicines or supplements without talking to your doctor or pharmacist first. 
When should you call for help? Call 911 anytime you think you may need emergency care. For example, call if: 
  · You passed out (lost consciousness).  
  · You have severe vaginal bleeding.   · You have severe pain in your belly or pelvis.  
  · You have had fluid gushing or leaking from your vagina and you know or think the umbilical cord is bulging into your vagina. If this happens, immediately get down on your knees so your rear end (buttocks) is higher than your head. This will decrease the pressure on the cord until help arrives. ·  
 Call your doctor now or seek immediate medical care if: 
  · You have signs of preeclampsia, such as: 
¨ Sudden swelling of your face, hands, or feet. ¨ New vision problems (such as dimness or blurring). ¨ A severe headache.  
  · You have any vaginal bleeding.  
  · You have belly pain or cramping.  
  · You have a fever.  
  · You have had regular contractions (with or without pain) for an hour. This means that you have 8 or more within 1 hour or 4 or more in 20 minutes after you change your position and drink fluids.  
  · You have a sudden release of fluid from your vagina.  
  · You have low back pain or pelvic pressure that does not go away.  
  · You notice that your baby has stopped moving or is moving much less than normal.  
 Watch closely for changes in your health, and be sure to contact your doctor if you have any problems. Where can you learn more? Go to http://damion-ashish.info/. Enter [id number] in the search box to learn more about \"Pregnancy Precautions: Care Instructions. \" Current as of: November 21, 2017 Content Version: 11.8 © 6557-4600 Farmstr. Care instructions adapted under license by Crunched (which disclaims liability or warranty for this information). If you have questions about a medical condition or this instruction, always ask your healthcare professional. [address] any warranty or liability for your use of this information. Please provide this summary of care documentation to your next provider. Signatures-by signing, you are acknowledging that this After Visit Summary has been reviewed with you and you have received a copy. Patient Signature:  ____________________________________________________________ Date:  ____________________________________________________________  
  
Cyndee Buys Provider Signature:  ____________________________________________________________ Date:  ____________________________________________________________

## 2018-10-15 NOTE — IP AVS SNAPSHOT
Summary of Care Report The Summary of Care report has been created to help improve care coordination. Users with access to [employer] or [address] (Web-based application) may access additional patient information including the Discharge Summary. If you are not currently a [address] user and need more information, please call the number listed below in the [place] [address] section and ask to be connected with Medical Records. Facility Information Name Address Phone [address] [facility] [address] [facility] [phone] Patient Information Patient Name Sex CAROLYN Maki ([id number]) Female 1985 Discharge Information Admitting Provider Service Area Unit Glenn Rodriguez MD / [address] Labor & Delivery / [phone] Discharge Provider Discharge Date/Time Discharge Disposition Destination (none) (none) (none) (none) Patient Language Language ENGLISH [13] Hospital Problems as of 10/15/2018  Reviewed: 2018  9:46 AM by Glenn Rodriguez MD  
  
  
  
 Class Noted - Resolved Last Modified POA Active Problems Pregnancy  10/15/2018 - Present 10/15/2018 by Glenn Rodriguez MD Unknown Entered by Glenn Rodriguez MD  
  
Non-Hospital Problems as of 10/15/2018  Reviewed: 2018  9:46 AM by Glenn Rodriguez MD  
  
  
  
 Class Noted - Resolved Last Modified Active Problems Hypoglycemia  Unknown - Present 12/15/2015 by Shannen Leonardo Entered by Shannen Leonardo Normal pregnancy  2016 - Present 2016 by Glenn Rodriguez MD  
  Entered by Glenn Rodriguez MD  
  Post-term pregnancy, 40-42 weeks of gestation  2016 - Present 2016 by Burt Maki DO Entered by Burt Maki DO   Post-dates pregnancy  2016 - Present 2016 by Burt Maki DO  
 Entered by Ivan Ortiz, DO You are allergic to the following No active allergies Current Discharge Medication List  
  
ASK your doctor about these medications Dose & Instructions Dispensing Information Comments AMBULATORY BREAST PUMP Double electric breast pump of patient's choice. Use as directed for breast feeding. Quantity:  1 Units Refills:  0  
   
 calcium carbonate 200 mg calcium (500 mg) Chew Commonly known as:  TUMS Dose:  1 Tab Take 1 Tab by mouth daily. Refills:  0  
   
 ibuprofen 800 mg tablet Commonly known as:  MOTRIN Dose:  800 mg Take 1 Tab by mouth every eight (8) hours as needed. Quantity:  30 Tab Refills:  1  
   
 norethindrone 0.35 mg Tab Commonly known as:  Law & Law Dose:  1 Tab Take 1 Tab by mouth daily. Quantity:  28 Tab Refills:  11 PRENATAL DHA+COMPLETE PRENATAL -300 mg-mcg-mg Cmpk Generic drug:  [id number] tina-folic-dha Take  by mouth. Refills:  0  
   
 senna-docusate 8.6-50 mg per tablet Commonly known as:  Rocio Leroy Dose:  1 Tab Take 1 Tab by mouth two (2) times daily as needed for Constipation. Quantity:  60 Tab Refills:  0 Current Immunizations Name Date Tdap 5/18/2016 Follow-up Information None Discharge Instructions None Chart Review Routing History No Routing History on File

## 2018-10-15 NOTE — IP AVS SNAPSHOT
[address] Patient: Nelson Morton MRN: [id number] :1985 A check jodie indicates which time of day the medication should be taken. My Medications ASK your doctor about these medications Instructions Each Dose to Equal  
 Morning Noon Evening Bedtime AMBULATORY BREAST PUMP Your last dose was: Your next dose is:    
   
   
 Double electric breast pump of patient's choice. Use as directed for breast feeding. calcium carbonate 200 mg calcium (500 mg) Chew Commonly known as:  TUMS Your last dose was: Your next dose is: Take 1 Tab by mouth daily. 1 Tab  
    
   
   
   
  
 ibuprofen 800 mg tablet Commonly known as:  MOTRIN Your last dose was: Your next dose is: Take 1 Tab by mouth every eight (8) hours as needed. 800 mg  
    
   
   
   
  
 norethindrone 0.35 mg Tab Commonly known as:  Law & Law Your last dose was: Your next dose is: Take 1 Tab by mouth daily. 1 Tab PRENATAL DHA+COMPLETE PRENATAL 30975-300 mg-mcg-mg Cmpk Generic drug:  [id number] tina-folic-dha Your last dose was: Your next dose is: Take  by mouth. senna-docusate 8.6-50 mg per tablet Commonly known as:  Haseeb Harding Your last dose was: Your next dose is: Take 1 Tab by mouth two (2) times daily as needed for Constipation. 1 Tab

## 2018-10-24 ENCOUNTER — ROUTINE PRENATAL (OUTPATIENT)
Dept: OBGYN CLINIC | Age: 33
End: 2018-10-24

## 2018-10-24 VITALS
HEIGHT: 72 IN | DIASTOLIC BLOOD PRESSURE: 80 MMHG | BODY MASS INDEX: 37.25 KG/M2 | WEIGHT: 275 LBS | HEART RATE: 86 BPM | SYSTOLIC BLOOD PRESSURE: 122 MMHG

## 2018-10-24 DIAGNOSIS — Z3A.26 PREGNANCY WITH 26 COMPLETED WEEKS GESTATION: Primary | ICD-10-CM

## 2018-10-24 NOTE — PROGRESS NOTES
26w5d.  No OB issues. Pt seen in L&D for DFM. Admits to increased anxiety. Stress management, exercise discussed and recommened. Glucola in 1 week. RTO 3 weeks.

## 2018-11-02 ENCOUNTER — HOSPITAL ENCOUNTER (OUTPATIENT)
Dept: LAB | Age: 33
Discharge: HOME | End: 2018-11-02
Payer: COMMERCIAL

## 2018-11-02 ENCOUNTER — OFFICE VISIT (OUTPATIENT)
Dept: OBGYN CLINIC | Age: 33
End: 2018-11-02

## 2018-11-02 DIAGNOSIS — Z3A.28 28 WEEKS GESTATION OF PREGNANCY: ICD-10-CM

## 2018-11-02 DIAGNOSIS — Z3A.28 28 WEEKS GESTATION OF PREGNANCY: Primary | ICD-10-CM

## 2018-11-02 LAB
BASOPHILS # BLD: 0 K/UL (ref 0–0.1)
BASOPHILS NFR BLD: 0 % (ref 0–2)
DIFFERENTIAL METHOD BLD: ABNORMAL
EOSINOPHIL # BLD: 0.1 K/UL (ref 0–0.4)
EOSINOPHIL NFR BLD: 1 % (ref 0–5)
ERYTHROCYTE [DISTWIDTH] IN BLOOD BY AUTOMATED COUNT: 13.4 % (ref 11.6–14.5)
GLUCOSE 1H P 100 G GLC PO SERPL-MCNC: 82 MG/DL (ref 60–140)
HCT VFR BLD AUTO: 33.4 % (ref 35–45)
HGB BLD-MCNC: 10.7 G/DL (ref 12–16)
LYMPHOCYTES # BLD: 1.3 K/UL (ref 0.9–3.6)
LYMPHOCYTES NFR BLD: 13 % (ref 21–52)
MCH RBC QN AUTO: 28.6 PG (ref 24–34)
MCHC RBC AUTO-ENTMCNC: 32 G/DL (ref 31–37)
MCV RBC AUTO: 89.3 FL (ref 74–97)
MONOCYTES # BLD: 0.9 K/UL (ref 0.05–1.2)
MONOCYTES NFR BLD: 8 % (ref 3–10)
NEUTS SEG # BLD: 8 K/UL (ref 1.8–8)
NEUTS SEG NFR BLD: 78 % (ref 40–73)
PLATELET # BLD AUTO: 250 K/UL (ref 135–420)
PMV BLD AUTO: 10.9 FL (ref 9.2–11.8)
RBC # BLD AUTO: 3.74 M/UL (ref 4.2–5.3)
WBC # BLD AUTO: 10.3 K/UL (ref 4.6–13.2)

## 2018-11-02 PROCEDURE — 85025 COMPLETE CBC W/AUTO DIFF WBC: CPT | Performed by: OBSTETRICS & GYNECOLOGY

## 2018-11-02 PROCEDURE — 82950 GLUCOSE TEST: CPT | Performed by: OBSTETRICS & GYNECOLOGY

## 2018-11-02 PROCEDURE — 36415 COLL VENOUS BLD VENIPUNCTURE: CPT | Performed by: OBSTETRICS & GYNECOLOGY

## 2018-11-02 NOTE — PROGRESS NOTES
T-dap 0.5 mL  GIVEN TO THIS PATIENT PER  order injection placed  THE left  arm .  LOT #  EXPIRES 03/09/21 : Obi Andujar 47 # [id number] Patient observed for 10 minutes and discharge home , Ms Jatinder Bradshaw  tolerated inject well   VISgiven to this patient

## 2018-11-14 ENCOUNTER — ROUTINE PRENATAL (OUTPATIENT)
Dept: OBGYN CLINIC | Age: 33
End: 2018-11-14

## 2018-11-14 VITALS
SYSTOLIC BLOOD PRESSURE: 122 MMHG | WEIGHT: 279 LBS | DIASTOLIC BLOOD PRESSURE: 80 MMHG | HEART RATE: 85 BPM | BODY MASS INDEX: 37.79 KG/M2 | HEIGHT: 72 IN

## 2018-11-14 DIAGNOSIS — Z3A.29 29 WEEKS GESTATION OF PREGNANCY: Primary | ICD-10-CM

## 2018-11-14 NOTE — PATIENT INSTRUCTIONS

## 2018-11-14 NOTE — PROGRESS NOTES
29w5d. No OB issues. Denies LOF/VB  Normal Glucola. Borderline CBC- increase iron rich food. RTO 2 weeks.

## 2018-11-29 ENCOUNTER — ROUTINE PRENATAL (OUTPATIENT)
Dept: OBGYN CLINIC | Age: 33
End: 2018-11-29

## 2018-11-29 VITALS
WEIGHT: 282 LBS | SYSTOLIC BLOOD PRESSURE: 117 MMHG | BODY MASS INDEX: 38.19 KG/M2 | DIASTOLIC BLOOD PRESSURE: 75 MMHG | HEIGHT: 72 IN | OXYGEN SATURATION: 100 % | HEART RATE: 85 BPM | RESPIRATION RATE: 18 BRPM

## 2018-11-29 DIAGNOSIS — Z34.83 PRENATAL CARE, SUBSEQUENT PREGNANCY, THIRD TRIMESTER: Primary | ICD-10-CM

## 2018-11-29 DIAGNOSIS — Z3A.31 31 WEEKS GESTATION OF PREGNANCY: ICD-10-CM

## 2018-11-29 NOTE — PROGRESS NOTES
Patient without complaints, good fetal movement. Size > dates, consider US for EFW if trend continues. Follow up 2 weeks on rotation. Plan of care discussed. Patient expressed understanding.

## 2018-12-13 ENCOUNTER — ROUTINE PRENATAL (OUTPATIENT)
Dept: OBGYN CLINIC | Age: 33
End: 2018-12-13

## 2018-12-13 VITALS
SYSTOLIC BLOOD PRESSURE: 109 MMHG | BODY MASS INDEX: 38.6 KG/M2 | RESPIRATION RATE: 20 BRPM | HEIGHT: 72 IN | OXYGEN SATURATION: 100 % | HEART RATE: 84 BPM | DIASTOLIC BLOOD PRESSURE: 79 MMHG | WEIGHT: 285 LBS

## 2018-12-13 DIAGNOSIS — Z3A.33 33 WEEKS GESTATION OF PREGNANCY: Primary | ICD-10-CM

## 2018-12-13 DIAGNOSIS — Z34.83 ENCOUNTER FOR SUPERVISION OF OTHER NORMAL PREGNANCY, THIRD TRIMESTER: ICD-10-CM

## 2018-12-13 PROBLEM — O26.843 UTERINE SIZE DATE DISCREPANCY PREGNANCY, THIRD TRIMESTER: Status: ACTIVE | Noted: 2018-12-13

## 2018-12-13 LAB
BILIRUB UR QL STRIP: NEGATIVE
GLUCOSE UR-MCNC: NEGATIVE MG/DL
KETONES P FAST UR STRIP-MCNC: NEGATIVE MG/DL
PH UR STRIP: 7 [PH] (ref 4.6–8)
PROT UR QL STRIP: NEGATIVE
SP GR UR STRIP: 1.02 (ref 1–1.03)
UA UROBILINOGEN AMB POC: NORMAL (ref 0.2–1)
URINALYSIS CLARITY POC: CLEAR
URINALYSIS COLOR POC: YELLOW
URINE BLOOD POC: NEGATIVE
URINE LEUKOCYTES POC: NEGATIVE
URINE NITRITES POC: NEGATIVE

## 2018-12-13 NOTE — PROGRESS NOTES
Ms. Nicole Carmona is a   33w6d with Estimated Date of Delivery: 19 presents to the office for a routine  prenatal visit. She denies contractions, leakage of fluid, or vaginal bleeding. She reports normal fetal movement. Visit Vitals  /79   Pulse 84   Resp 20   Ht 6' 3\" (1.905 m)   Wt 285 lb (129.3 kg)   LMP 2018 (Exact Date)   SpO2 100%   BMI 35.62 kg/m²       A/P  33w6d IUP, normal prenatal visit. 1. Continue routine prenatal care  2. Strict 3rd trimester precautions given. Advised regarding leakage of fluid, contractions, and vaginal bleeding. Fetal kick count instructions given. 3. F/U in 2 weeks  4.  Growth U/S due to size>dates

## 2019-01-02 ENCOUNTER — HOSPITAL ENCOUNTER (OUTPATIENT)
Dept: LAB | Age: 34
Discharge: HOME | End: 2019-01-02
Payer: COMMERCIAL

## 2019-01-02 ENCOUNTER — ROUTINE PRENATAL (OUTPATIENT)
Dept: OBGYN CLINIC | Age: 34
End: 2019-01-02

## 2019-01-02 VITALS
TEMPERATURE: 97.7 F | BODY MASS INDEX: 28.62 KG/M2 | SYSTOLIC BLOOD PRESSURE: 128 MMHG | HEART RATE: 79 BPM | WEIGHT: 229 LBS | DIASTOLIC BLOOD PRESSURE: 85 MMHG

## 2019-01-02 DIAGNOSIS — Z3A.35 35 WEEKS GESTATION OF PREGNANCY: ICD-10-CM

## 2019-01-02 DIAGNOSIS — Z3A.35 35 WEEKS GESTATION OF PREGNANCY: Primary | ICD-10-CM

## 2019-01-02 LAB — GRBS, EXTERNAL: NEGATIVE

## 2019-01-02 PROCEDURE — 87081 CULTURE SCREEN ONLY: CPT

## 2019-01-02 PROCEDURE — 87491 CHLMYD TRACH DNA AMP PROBE: CPT

## 2019-01-02 NOTE — PATIENT INSTRUCTIONS
Weeks 34 to 36 of Your Pregnancy: Care Instructions  Your Care Instructions    By now, your baby and your belly have grown quite large. It is almost time to give birth. A full-term pregnancy can deliver between 37 and 42 weeks. Your baby's lungs are almost ready to breathe air. The bones in your baby's head are now firm enough to protect it, but soft enough to move down through the birth canal.  You may feel excited, happy, anxious, or scared. You may wonder how you will know if you are in labor or what to expect during labor. Try to be flexible in your expectations of the birth. Because each birth is different, there is no way to know exactly what childbirth will be like for you. This care sheet will help you know what to expect and how to prepare. This may make your childbirth easier. If you haven't already had the Tdap shot during this pregnancy, talk to your doctor about getting it. It will help protect your  against pertussis infection. In the 36th week, most women have a test for group B streptococcus (GBS). GBS is a common bacteria that can live in the vagina and rectum. It can make your baby sick after birth. If you test positive, you will get antibiotics during labor. The medicine will keep your baby from getting the bacteria. Follow-up care is a key part of your treatment and safety. Be sure to make and go to all appointments, and call your doctor if you are having problems. It's also a good idea to know your test results and keep a list of the medicines you take. How can you care for yourself at home? Learn about pain relief choices  · Pain is different for every woman. Talk with your doctor about your feelings about pain. · You can choose from several types of pain relief. These include medicine or breathing techniques, as well as comfort measures. You can use more than one option. · If you choose to have pain medicine during labor, talk to your doctor about your options.  Some medicines lower anxiety and help with some of the pain. Others make your lower body numb so that you won't feel pain. · Be sure to tell your doctor about your pain medicine choice before you start labor or very early in your labor. You may be able to change your mind as labor progresses. · Rarely, a woman is put to sleep by medicine given through a mask or an IV. Labor and delivery  · The first stage of labor has three parts: early, active, and transition. ? Most women have early labor at home. You can stay busy or rest, eat light snacks, drink clear fluids, and start counting contractions. ? When talking during a contraction gets hard, you may be moving to active labor. During active labor, you should head for the hospital if you are not there already. ? You are in active labor when contractions come every 3 to 4 minutes and last about 60 seconds. Your cervix is opening more rapidly. ? If your water breaks, contractions will come faster and stronger. ? During transition, your cervix is stretching, and contractions are coming more rapidly. ? You may want to push, but your cervix might not be ready. Your doctor will tell you when to push. · The second stage starts when your cervix is completely opened and you are ready to push. ? Contractions are very strong to push the baby down the birth canal.  ? You will feel the urge to push. You may feel like you need to have a bowel movement. ? You may be coached to push with contractions. These contractions will be very strong, but you will not have them as often. You can get a little rest between contractions. ? You may be emotional and irritable. You may not be aware of what is going on around you.  ? One last push, and your baby is born. · The third stage is when a few more contractions push out the placenta. This may take 30 minutes or less. · The fourth stage is the welcome recovery. You may feel overwhelmed with emotions and exhausted but alert.  This is a good time to start breastfeeding. Where can you learn more? Go to http://damion-ashish.info/. Enter N171 in the search box to learn more about \"Weeks 34 to 36 of Your Pregnancy: Care Instructions. \"  Current as of: November 21, 2017  Content Version: 11.8  © 3974-8413 [employer]. Care instructions adapted under license by [employer] (which disclaims liability or warranty for this information). If you have questions about a medical condition or this instruction, always ask your healthcare professional. [address] any warranty or liability for your use of this information.

## 2019-01-02 NOTE — PROGRESS NOTES
36w5d. No OB issues. Notes increased vaginal discharge. Denies LOF/VB  GBS and cultures done. Declined SVE  RTO 1 week.

## 2019-01-03 LAB
C TRACH RRNA SPEC QL NAA+PROBE: NEGATIVE
N GONORRHOEA RRNA SPEC QL NAA+PROBE: NEGATIVE
SPECIMEN SOURCE: NORMAL
T VAGINALIS RRNA SPEC QL NAA+PROBE: NEGATIVE

## 2019-01-05 LAB
BACTERIA SPEC CULT: NEGATIVE
SERVICE CMNT-IMP: NORMAL

## 2019-01-09 ENCOUNTER — ROUTINE PRENATAL (OUTPATIENT)
Dept: OBGYN CLINIC | Age: 34
End: 2019-01-09

## 2019-01-09 VITALS
SYSTOLIC BLOOD PRESSURE: 120 MMHG | TEMPERATURE: 97.2 F | HEIGHT: 72 IN | HEART RATE: 88 BPM | BODY MASS INDEX: 39.42 KG/M2 | DIASTOLIC BLOOD PRESSURE: 81 MMHG | WEIGHT: 291 LBS

## 2019-01-09 DIAGNOSIS — Z3A.37 37 WEEKS GESTATION OF PREGNANCY: Primary | ICD-10-CM

## 2019-01-09 NOTE — PATIENT INSTRUCTIONS

## 2019-01-16 ENCOUNTER — ROUTINE PRENATAL (OUTPATIENT)
Dept: OBGYN CLINIC | Age: 34
End: 2019-01-16

## 2019-01-16 VITALS
SYSTOLIC BLOOD PRESSURE: 143 MMHG | RESPIRATION RATE: 18 BRPM | HEART RATE: 90 BPM | BODY MASS INDEX: 39.42 KG/M2 | DIASTOLIC BLOOD PRESSURE: 87 MMHG | WEIGHT: 291 LBS | HEIGHT: 72 IN

## 2019-01-16 DIAGNOSIS — Z3A.39 PREGNANCY WITH 39 COMPLETED WEEKS GESTATION: Primary | ICD-10-CM

## 2019-01-23 ENCOUNTER — ROUTINE PRENATAL (OUTPATIENT)
Dept: OBGYN CLINIC | Age: 34
End: 2019-01-23

## 2019-01-23 VITALS
SYSTOLIC BLOOD PRESSURE: 124 MMHG | WEIGHT: 293 LBS | BODY MASS INDEX: 39.68 KG/M2 | RESPIRATION RATE: 18 BRPM | HEIGHT: 72 IN | DIASTOLIC BLOOD PRESSURE: 89 MMHG | HEART RATE: 80 BPM

## 2019-01-23 DIAGNOSIS — Z3A.39 PREGNANCY WITH 39 COMPLETED WEEKS GESTATION: Primary | ICD-10-CM

## 2019-01-23 PROBLEM — O99.013 ANEMIA DURING PREGNANCY IN THIRD TRIMESTER: Status: ACTIVE | Noted: 2019-01-23

## 2019-01-23 NOTE — PROGRESS NOTES
39w5d. No OB issues. Denies LOF/VB  Labor precautions. RTO 4 days for ultrasound and NST if not delivered.

## 2019-01-23 NOTE — PATIENT INSTRUCTIONS
Week 39 of Your Pregnancy: Care Instructions  Your Care Instructions    During these final weeks, you may feel anxious to see your new baby. [place] babies often look different from what you see in pictures or movies. Right after birth, their heads may have a strange shape. Their eyes may be puffy. And their genitals may be swollen. They may also have very dry skin, or red marks on the eyelids, nose, or neck. Still, most parents think their babies are beautiful. Follow-up care is a key part of your treatment and safety. Be sure to make and go to all appointments, and call your doctor if you are having problems. It's also a good idea to know your test results and keep a list of the medicines you take. How can you care for yourself at home? Prepare to breastfeed  · If you are breastfeeding, continue to eat healthy foods. · If your health care provider recommends it, keep taking your prenatal vitamins. · Talk to your doctor before you take any medicine or supplement. That's because some medicines can affect your breast milk. · You can help prevent sore nipples if you feed your baby in the correct position. Nurses will help you learn to do this. · Your  will need to be fed about every 1 to 3 hours. Choose the right birth control after your baby is born  · Women who are breastfeeding can still get pregnant. Use birth control if you don't want to get pregnant. · Intrauterine devices (IUDs) work for women who want to wait at least 2 years before getting pregnant again. They are safe to use while you are breastfeeding. · Depo-Provera can be used while you are breastfeeding. It is a shot you get every 3 months. · Birth control pills work well. But you need a different kind of pill while you are breastfeeding. And when you start taking these pills, you need to make sure to use another type of birth control until you start your second pack.   · Diaphragms, cervical caps, tubal implants, and condoms with spermicide work less well after birth. If you have a diaphragm or cervical cap, you will need to have it refitted. · Tubal ligation (tying your tubes) and vasectomy are both permanent. These are good options if you are sure you are done having children. Where can you learn more? Go to http://damion-ashish.info/. Enter Z419 in the search box to learn more about \"Week 39 of Your Pregnancy: Care Instructions. \"  Current as of: September 5, 2018  Content Version: 11.9  © 4710-6591 [employer]. Care instructions adapted under license by [employer] (which disclaims liability or warranty for this information). If you have questions about a medical condition or this instruction, always ask your healthcare professional. [address] any warranty or liability for your use of this information.

## 2019-01-29 ENCOUNTER — CLINICAL SUPPORT (OUTPATIENT)
Dept: OBGYN CLINIC | Age: 34
End: 2019-01-29

## 2019-01-29 ENCOUNTER — TELEPHONE (OUTPATIENT)
Dept: OBGYN CLINIC | Age: 34
End: 2019-01-29

## 2019-01-29 ENCOUNTER — HOSPITAL ENCOUNTER (OUTPATIENT)
Age: 34
Discharge: HOME | End: 2019-01-29
Attending: OBSTETRICS & GYNECOLOGY | Admitting: OBSTETRICS & GYNECOLOGY
Payer: COMMERCIAL

## 2019-01-29 ENCOUNTER — ROUTINE PRENATAL (OUTPATIENT)
Dept: OBGYN CLINIC | Age: 34
End: 2019-01-29

## 2019-01-29 VITALS
TEMPERATURE: 98.3 F | WEIGHT: 292 LBS | BODY MASS INDEX: 39.55 KG/M2 | SYSTOLIC BLOOD PRESSURE: 127 MMHG | HEIGHT: 72 IN | HEART RATE: 83 BPM | RESPIRATION RATE: 17 BRPM | DIASTOLIC BLOOD PRESSURE: 78 MMHG

## 2019-01-29 VITALS
DIASTOLIC BLOOD PRESSURE: 72 MMHG | OXYGEN SATURATION: 100 % | SYSTOLIC BLOOD PRESSURE: 110 MMHG | HEART RATE: 77 BPM | BODY MASS INDEX: 39.66 KG/M2 | RESPIRATION RATE: 20 BRPM | HEIGHT: 72 IN | WEIGHT: 292.8 LBS

## 2019-01-29 DIAGNOSIS — O48.0 POST-TERM PREGNANCY, 40-42 WEEKS OF GESTATION: Primary | ICD-10-CM

## 2019-01-29 DIAGNOSIS — Z3A.40 40 WEEKS GESTATION OF PREGNANCY: Primary | ICD-10-CM

## 2019-01-29 PROCEDURE — 59025 FETAL NON-STRESS TEST: CPT

## 2019-01-29 NOTE — TELEPHONE ENCOUNTER
Call received form Pt's Short Term disability , concerning delivery date , informed him she had not delivered

## 2019-01-29 NOTE — PROGRESS NOTES
1. Have you been to the ER, urgent care clinic since your last visit? Hospitalized since your last visit? No    2. Have you seen or consulted any other health care providers outside of the [address] [place] [address] since your last visit? Include any pap smears or colon screening.  No

## 2019-01-29 NOTE — PROGRESS NOTES
1300 Pt arrived via ambulatory for NST re post dates. Sent from office. Accompanied by spouse. . 40wks and 4days. Oriented to room and monitors hooked. Side rails up. Call light w/in reach. 1330 Call to Dr. Peter Rojo. Informed her of Cat 1 FHR tracing and pt status. Orders obtained to D/C pt home. 1350 Discharge education and follow up appointment reviewed w/ pt and spouse. Questions answered. No other needs or concerns at this time. 1400 Pt d/c home via ambulation off unit. Accompanied by spouse.

## 2019-01-29 NOTE — PROGRESS NOTES
40w4d. No OB issues. Denies LOF/VB  Has occasional contractions. IOL scheduled for 2/6/19 @ 6 pm.  Labor precautions. RTO 1 week.

## 2019-01-31 ENCOUNTER — ANESTHESIA EVENT (OUTPATIENT)
Dept: LABOR AND DELIVERY | Age: 34
End: 2019-01-31
Payer: COMMERCIAL

## 2019-01-31 ENCOUNTER — ANESTHESIA (OUTPATIENT)
Dept: LABOR AND DELIVERY | Age: 34
End: 2019-01-31
Payer: COMMERCIAL

## 2019-01-31 ENCOUNTER — HOSPITAL ENCOUNTER (INPATIENT)
Age: 34
LOS: 2 days | Discharge: HOME | End: 2019-02-02
Attending: OBSTETRICS & GYNECOLOGY | Admitting: OBSTETRICS & GYNECOLOGY
Payer: COMMERCIAL

## 2019-01-31 LAB
ABO + RH BLD: NORMAL
BASOPHILS # BLD: 0 K/UL (ref 0–0.1)
BASOPHILS NFR BLD: 0 % (ref 0–2)
BLOOD GROUP ANTIBODIES SERPL: NORMAL
DIFFERENTIAL METHOD BLD: ABNORMAL
EOSINOPHIL # BLD: 0.1 K/UL (ref 0–0.4)
EOSINOPHIL NFR BLD: 1 % (ref 0–5)
ERYTHROCYTE [DISTWIDTH] IN BLOOD BY AUTOMATED COUNT: 13.3 % (ref 11.6–14.5)
HCT VFR BLD AUTO: 36.4 % (ref 35–45)
HGB BLD-MCNC: 12.2 G/DL (ref 12–16)
LYMPHOCYTES # BLD: 2.3 K/UL (ref 0.9–3.6)
LYMPHOCYTES NFR BLD: 25 % (ref 21–52)
MCH RBC QN AUTO: 29.1 PG (ref 24–34)
MCHC RBC AUTO-ENTMCNC: 33.5 G/DL (ref 31–37)
MCV RBC AUTO: 86.9 FL (ref 74–97)
MONOCYTES # BLD: 0.6 K/UL (ref 0.05–1.2)
MONOCYTES NFR BLD: 7 % (ref 3–10)
NEUTS SEG # BLD: 6.3 K/UL (ref 1.8–8)
NEUTS SEG NFR BLD: 67 % (ref 40–73)
PLATELET # BLD AUTO: 192 K/UL (ref 135–420)
PMV BLD AUTO: 10.6 FL (ref 9.2–11.8)
RBC # BLD AUTO: 4.19 M/UL (ref 4.2–5.3)
SPECIMEN EXP DATE BLD: NORMAL
WBC # BLD AUTO: 9.4 K/UL (ref 4.6–13.2)

## 2019-01-31 PROCEDURE — 77030034849

## 2019-01-31 PROCEDURE — 65270000029 HC RM PRIVATE

## 2019-01-31 PROCEDURE — 74011000250 HC RX REV CODE- 250

## 2019-01-31 PROCEDURE — 85025 COMPLETE CBC W/AUTO DIFF WBC: CPT

## 2019-01-31 PROCEDURE — 86900 BLOOD TYPING SEROLOGIC ABO: CPT

## 2019-01-31 PROCEDURE — 74011250636 HC RX REV CODE- 250/636

## 2019-01-31 PROCEDURE — 74011250636 HC RX REV CODE- 250/636: Performed by: OBSTETRICS & GYNECOLOGY

## 2019-01-31 PROCEDURE — 74011250636 HC RX REV CODE- 250/636: Performed by: NURSE ANESTHETIST, CERTIFIED REGISTERED

## 2019-01-31 PROCEDURE — 74011250637 HC RX REV CODE- 250/637

## 2019-01-31 PROCEDURE — 77030007879 HC KT SPN EPDRL TELE -B: Performed by: NURSE ANESTHETIST, CERTIFIED REGISTERED

## 2019-01-31 PROCEDURE — 3E0R3BZ INTRODUCTION OF ANESTHETIC AGENT INTO SPINAL CANAL, PERCUTANEOUS APPROACH: ICD-10-PCS | Performed by: NURSE ANESTHETIST, CERTIFIED REGISTERED

## 2019-01-31 RX ORDER — SODIUM CHLORIDE, SODIUM LACTATE, POTASSIUM CHLORIDE, CALCIUM CHLORIDE 600; 310; 30; 20 MG/100ML; MG/100ML; MG/100ML; MG/100ML
125 INJECTION, SOLUTION INTRAVENOUS CONTINUOUS
Status: DISCONTINUED | OUTPATIENT
Start: 2019-01-31 | End: 2019-02-01

## 2019-01-31 RX ORDER — CASTOR OIL 100 %
OIL (ML) ORAL
Status: COMPLETED
Start: 2019-01-31 | End: 2019-01-31

## 2019-01-31 RX ORDER — FENTANYL CITRATE 50 UG/ML
100 INJECTION, SOLUTION INTRAMUSCULAR; INTRAVENOUS ONCE
Status: COMPLETED | OUTPATIENT
Start: 2019-01-31 | End: 2019-01-31

## 2019-01-31 RX ORDER — EPHEDRINE SULFATE 50 MG/ML
10 INJECTION, SOLUTION INTRAVENOUS AS NEEDED
Status: DISCONTINUED | OUTPATIENT
Start: 2019-01-31 | End: 2019-02-01

## 2019-01-31 RX ORDER — LIDOCAINE HYDROCHLORIDE 20 MG/ML
INJECTION, SOLUTION EPIDURAL; INFILTRATION; INTRACAUDAL; PERINEURAL AS NEEDED
Status: DISCONTINUED | OUTPATIENT
Start: 2019-01-31 | End: 2019-02-01 | Stop reason: HOSPADM

## 2019-01-31 RX ORDER — NALBUPHINE HYDROCHLORIDE 10 MG/ML
10 INJECTION, SOLUTION INTRAMUSCULAR; INTRAVENOUS; SUBCUTANEOUS
Status: DISCONTINUED | OUTPATIENT
Start: 2019-01-31 | End: 2019-02-01

## 2019-01-31 RX ORDER — MAG HYDROX/ALUMINUM HYD/SIMETH 200-200-20
15 SUSPENSION, ORAL (FINAL DOSE FORM) ORAL
Status: DISCONTINUED | OUTPATIENT
Start: 2019-01-31 | End: 2019-02-01

## 2019-01-31 RX ORDER — LIDOCAINE HYDROCHLORIDE 10 MG/ML
30 INJECTION, SOLUTION EPIDURAL; INFILTRATION; INTRACAUDAL; PERINEURAL AS NEEDED
Status: DISCONTINUED | OUTPATIENT
Start: 2019-01-31 | End: 2019-02-01

## 2019-01-31 RX ORDER — CASTOR OIL 100 %
90 OIL (ML) ORAL ONCE
Status: COMPLETED | OUTPATIENT
Start: 2019-01-31 | End: 2019-01-31

## 2019-01-31 RX ORDER — OXYTOCIN/RINGER'S LACTATE 20/1000 ML
125 PLASTIC BAG, INJECTION (ML) INTRAVENOUS AS NEEDED
Status: DISCONTINUED | OUTPATIENT
Start: 2019-01-31 | End: 2019-02-01

## 2019-01-31 RX ORDER — OXYTOCIN 10 [USP'U]/ML
10 INJECTION, SOLUTION INTRAMUSCULAR; INTRAVENOUS
Status: DISCONTINUED | OUTPATIENT
Start: 2019-01-31 | End: 2019-02-01

## 2019-01-31 RX ORDER — PHENYLEPHRINE HCL IN 0.9% NACL 0.4MG/10ML
100 SYRINGE (ML) INTRAVENOUS AS NEEDED
Status: DISCONTINUED | OUTPATIENT
Start: 2019-01-31 | End: 2019-02-01

## 2019-01-31 RX ORDER — METHYLERGONOVINE MALEATE 0.2 MG/ML
0.2 INJECTION INTRAVENOUS AS NEEDED
Status: DISCONTINUED | OUTPATIENT
Start: 2019-01-31 | End: 2019-02-01

## 2019-01-31 RX ORDER — MISOPROSTOL 200 UG/1
800 TABLET ORAL
Status: DISCONTINUED | OUTPATIENT
Start: 2019-01-31 | End: 2019-02-01

## 2019-01-31 RX ORDER — ONDANSETRON 2 MG/ML
4 INJECTION INTRAMUSCULAR; INTRAVENOUS
Status: DISCONTINUED | OUTPATIENT
Start: 2019-01-31 | End: 2019-02-01

## 2019-01-31 RX ORDER — FENTANYL CITRATE 50 UG/ML
INJECTION, SOLUTION INTRAMUSCULAR; INTRAVENOUS
Status: COMPLETED
Start: 2019-01-31 | End: 2019-01-31

## 2019-01-31 RX ORDER — TERBUTALINE SULFATE 1 MG/ML
0.25 INJECTION SUBCUTANEOUS
Status: DISCONTINUED | OUTPATIENT
Start: 2019-01-31 | End: 2019-02-01

## 2019-01-31 RX ORDER — HYDROMORPHONE HYDROCHLORIDE 1 MG/ML
1 INJECTION, SOLUTION INTRAMUSCULAR; INTRAVENOUS; SUBCUTANEOUS
Status: DISCONTINUED | OUTPATIENT
Start: 2019-01-31 | End: 2019-02-01

## 2019-01-31 RX ORDER — CARBOPROST TROMETHAMINE 250 UG/ML
250 INJECTION, SOLUTION INTRAMUSCULAR
Status: DISCONTINUED | OUTPATIENT
Start: 2019-01-31 | End: 2019-02-01

## 2019-01-31 RX ORDER — OXYTOCIN/RINGER'S LACTATE 20/1000 ML
999 PLASTIC BAG, INJECTION (ML) INTRAVENOUS AS NEEDED
Status: DISCONTINUED | OUTPATIENT
Start: 2019-01-31 | End: 2019-02-01

## 2019-01-31 RX ADMIN — SODIUM CHLORIDE, SODIUM LACTATE, POTASSIUM CHLORIDE, AND CALCIUM CHLORIDE 500 ML: 600; 310; 30; 20 INJECTION, SOLUTION INTRAVENOUS at 22:35

## 2019-01-31 RX ADMIN — FENTANYL CITRATE 100 MCG: 50 INJECTION, SOLUTION INTRAMUSCULAR; INTRAVENOUS at 22:49

## 2019-01-31 RX ADMIN — Medication 2500 MILLI-UNITS/HR: at 23:55

## 2019-01-31 RX ADMIN — LIDOCAINE HYDROCHLORIDE 5 ML: 20 INJECTION, SOLUTION EPIDURAL; INFILTRATION; INTRACAUDAL; PERINEURAL at 22:50

## 2019-01-31 RX ADMIN — CASTOR OIL 90 ML: 1 LIQUID ORAL at 23:15

## 2019-01-31 RX ADMIN — Medication 90 ML: at 23:15

## 2019-01-31 RX ADMIN — Medication 19980 MILLI-UNITS/HR: at 23:20

## 2019-01-31 RX ADMIN — SODIUM CHLORIDE, SODIUM LACTATE, POTASSIUM CHLORIDE, AND CALCIUM CHLORIDE 1000 ML: 600; 310; 30; 20 INJECTION, SOLUTION INTRAVENOUS at 21:50

## 2019-01-31 RX ADMIN — Medication 10 ML/HR: at 22:51

## 2019-02-01 LAB
HCT VFR BLD AUTO: 32 % (ref 35–45)
HGB BLD-MCNC: 10.6 G/DL (ref 12–16)

## 2019-02-01 PROCEDURE — 85014 HEMATOCRIT: CPT

## 2019-02-01 PROCEDURE — 85018 HEMOGLOBIN: CPT

## 2019-02-01 PROCEDURE — 76060000078 HC EPIDURAL ANESTHESIA

## 2019-02-01 PROCEDURE — 65270000029 HC RM PRIVATE

## 2019-02-01 PROCEDURE — 77030036554

## 2019-02-01 PROCEDURE — 77030008703 HC TU ET UNCUF COVD -A

## 2019-02-01 PROCEDURE — 75410000002 HC LABOR FEE PER 1 HR

## 2019-02-01 PROCEDURE — 36415 COLL VENOUS BLD VENIPUNCTURE: CPT

## 2019-02-01 PROCEDURE — 75410000000 HC DELIVERY VAGINAL/SINGLE

## 2019-02-01 PROCEDURE — 74011250637 HC RX REV CODE- 250/637: Performed by: OBSTETRICS & GYNECOLOGY

## 2019-02-01 PROCEDURE — 75410000003 HC RECOV DEL/VAG/CSECN EA 0.5 HR

## 2019-02-01 RX ORDER — OXYCODONE AND ACETAMINOPHEN 5; 325 MG/1; MG/1
2 TABLET ORAL
Status: DISCONTINUED | OUTPATIENT
Start: 2019-02-01 | End: 2019-02-02 | Stop reason: HOSPADM

## 2019-02-01 RX ORDER — ZOLPIDEM TARTRATE 5 MG/1
5 TABLET ORAL
Status: DISCONTINUED | OUTPATIENT
Start: 2019-02-01 | End: 2019-02-02 | Stop reason: HOSPADM

## 2019-02-01 RX ORDER — PROMETHAZINE HYDROCHLORIDE 25 MG/ML
25 INJECTION, SOLUTION INTRAMUSCULAR; INTRAVENOUS
Status: DISCONTINUED | OUTPATIENT
Start: 2019-02-01 | End: 2019-02-02 | Stop reason: HOSPADM

## 2019-02-01 RX ORDER — IBUPROFEN 400 MG/1
800 TABLET ORAL
Status: DISCONTINUED | OUTPATIENT
Start: 2019-02-01 | End: 2019-02-02 | Stop reason: HOSPADM

## 2019-02-01 RX ORDER — ACETAMINOPHEN 325 MG/1
650 TABLET ORAL
Status: DISCONTINUED | OUTPATIENT
Start: 2019-02-01 | End: 2019-02-02 | Stop reason: HOSPADM

## 2019-02-01 RX ORDER — AMOXICILLIN 250 MG
1 CAPSULE ORAL
Status: DISCONTINUED | OUTPATIENT
Start: 2019-02-01 | End: 2019-02-02 | Stop reason: HOSPADM

## 2019-02-01 RX ADMIN — ACETAMINOPHEN 650 MG: 325 TABLET, FILM COATED ORAL at 12:24

## 2019-02-01 RX ADMIN — IBUPROFEN 800 MG: 400 TABLET ORAL at 23:47

## 2019-02-01 RX ADMIN — IBUPROFEN 800 MG: 400 TABLET ORAL at 08:21

## 2019-02-01 RX ADMIN — SENNOSIDES AND DOCUSATE SODIUM 1 TABLET: 8.6; 5 TABLET ORAL at 12:24

## 2019-02-01 NOTE — PROGRESS NOTES
Problem: Falls - Risk of 
Goal: *Absence of Falls Document Leslee Grant Fall Risk and appropriate interventions in the flowsheet. Fall Risk Interventions:

## 2019-02-01 NOTE — PROGRESS NOTES
Progress Note Patient: Luh Mcelroy MRN: [id number]  SSN: [id number] YOB: 1985  Age: 36 y.o. Sex: female Subjective: Patient without complaints. PostOp Day: 1 Delivery: vaginal delivery The patient feels well. The patient denies emotional concerns. Pain is  well controlled with current medications. The baby iswell. Baby is feeding via breast. Urinary output is adequate. Voiding spontaneous. The patient is ambulating well. The patient is tolerating a normal diet. Objective:  
  
Patient Vitals for the past 8 hrs: 
 BP Temp Pulse Resp SpO2  
02/01/19 0821 130/80 97.7 °F (36.5 °C) 71 14 99 % 02/01/19 0300 115/63 98 °F (36.7 °C) 72 14 99 % General:    alert, cooperative, no distress Lochia:  appropriate Uterine Fundus:   firm Fundus Location:  0 Incision:  none DVT Evaluation:  No evidence of DVT seen on physical exam.  
 
Lab/Data Review: CBC:  
Lab Results Component Value Date/Time WBC 9.4 01/31/2019 09:50 PM  
 HGB 10.6 (L) 02/01/2019 05:45 AM  
 HCT 32.0 (L) 02/01/2019 05:45 AM  
  01/31/2019 09:50 PM  
 
 
Assessment:  
 
Status post: Doing well postpartum vaginal delivery Plan:  
 
Postpartum care discussed including diet, ambulation, and actvitiy restrictions. Discharge instructions and questions answered for vaginal delivery. Signed By: Fred Paiz DO February 1, 2019

## 2019-02-01 NOTE — PROGRESS NOTES
TRANSFER - IN REPORT: 
 
Verbal report received from DIANE Andrade RN(name) on Susan Vital  being received from L&D (unit) for routine progression of care Report consisted of patients Situation, Background, Assessment and  
Recommendations(SBAR). Information from the following report(s) SBAR, Kardex, Intake/Output, MAR, Accordion, Recent Results and Med Rec Status was reviewed with the receiving nurse. Opportunity for questions and clarification was provided. Assessment completed upon patients arrival to unit and care assumed.

## 2019-02-01 NOTE — LACTATION NOTE
Mom feeding baby, cradle hold. Baby latched well and is nursing well. Reviewed nursing pattern and expectations, colostrum, size of stomach, feeding cues. Experienced mom, no other questions at this time. Offered help with feedings.

## 2019-02-01 NOTE — ROUTINE PROCESS
Patient arrived to unit via wheelchair, accompanied by FOB with c/o contractions, patient denies any vaginal bleeding or leaking of fluid. Patient has been placed on TOCO and FHR monitoring. 2215 Dr. Te Kaur at bedside. 2232 Patient c/o pain 10/10 with contractions, request epidural, Geraldine Wheeler CRNA at bedside. 2245 Test dose administered through epidural catheter. 2247 Patient states she has an urge to push, Dr. Te Kaur at bedside. 0 Nursery and pediatrician called to bedside for meconium delivery. 2320  of viable baby girl over intact perineum, cord clamped by MD and cut by FOB. EBL 250ml. Infant taken to warmer for evaluation by nursery staff. 
2454 Patient ambulated to restroom with assistance and was able to void, renny-care given, pads and gown changed. 0250 TRANSFER - OUT REPORT: 
 
Verbal report given to HAKAN Cotton RN(name) on Ectorda Bear  being transferred to [facility] Rm 3412(unit) for routine progression of care Report consisted of patients Situation, Background, Assessment and  
Recommendations(SBAR). Information from the following report(s) SBAR, Kardex, Intake/Output, MAR and Recent Results was reviewed with the receiving nurse. Lines:  
Peripheral IV 19 Left Forearm (Active) Opportunity for questions and clarification was provided. Patient transported with: 
 Registered Nurse

## 2019-02-01 NOTE — ANESTHESIA PREPROCEDURE EVALUATION
Anesthetic History No history of anesthetic complications Review of Systems / Medical History Pulmonary Within defined limits Neuro/Psych Within defined limits Cardiovascular Within defined limits GI/Hepatic/Renal 
Within defined limits Endo/Other Within defined limits Other Findings Physical Exam 
 
Airway Mallampati: II 
TM Distance: > 6 cm Neck ROM: normal range of motion Mouth opening: Normal 
 
 Cardiovascular Regular rate and rhythm,  S1 and S2 normal,  no murmur, click, rub, or gallop Dental 
No notable dental hx Pulmonary Breath sounds clear to auscultation Abdominal 
GI exam deferred Other Findings Anesthetic Plan ASA: 2 Anesthesia type: epidural 
 
 
 
 
 
Anesthetic plan and risks discussed with: Patient and Spouse

## 2019-02-01 NOTE — H&P
History & Physical 
 
Name: Hortencia Hansen MRN: [id number]  SSN: [id number] YOB: 1985  Age: 36 y.o. Sex: female Subjective: Patient complains of regular contractions, denies leaking of fluid. Estimated Date of Delivery: 19 OB History  Para Term  AB Living 2 1 1     1 SAB TAB Ectopic Molar Multiple Live Births Ms. Philis Mcburney is admitted with pregnancy at 40w6d for active labor. Prenatal course was normal. Please see prenatal records for details. Past Medical History:  
Diagnosis Date  Hypoglycemia  Hypoglycemia 2015  Normal pregnancy 2016  Post-term pregnancy, 40-42 weeks of gestation 2016 Past Surgical History:  
Procedure Laterality Date  HX OTHER SURGICAL Right 10/2015  
 right upper second molar taken out Social History Occupational History  Not on file Tobacco Use  Smoking status: Never Smoker  Smokeless tobacco: Never Used Substance and Sexual Activity  Alcohol use: No  
 Drug use: No  
 Sexual activity: Yes  
  Partners: Male Family History Problem Relation Age of Onset  Hypertension Mother  Diabetes Mother  High Cholesterol Mother  Heart Disease Father  Hypertension Father  High Cholesterol Father No Known Allergies Prior to Admission medications Medication Sig Start Date End Date Taking? Authorizing Provider  
norethindrone (MICRONOR) 0.35 mg tab Take 1 Tab by mouth daily. 16   Isabelle Copas, DO  
ibuprofen (MOTRIN) 800 mg tablet Take 1 Tab by mouth every eight (8) hours as needed. 16   Brooklyn Frazier DO  
senna-docusate (PERICOLACE) 8.6-50 mg per tablet Take 1 Tab by mouth two (2) times daily as needed for Constipation. 16   Brooklyn Frazier DO  
calcium carbonate (TUMS) 200 mg calcium (500 mg) chew Take 1 Tab by mouth daily.     Provider, Historical  
 AMBULATORY BREAST PUMP Double electric breast pump of patient's choice. Use as directed for breast feeding. 5/18/16   Cooper Pereira DO  
PNV no.24-iron-folic acid-dha (PRENATAL DHA+COMPLETE PRENATAL) -329 mg-mcg-mg cmpk Take  by mouth. Provider, Historical  
  
 
Review of Systems: A comprehensive review of systems was negative except for that written in the HPI. Objective:  
 
Vitals: There were no vitals filed for this visit. Physical Exam: 
Patient with distress. During contractions. Fundus: soft and non tender Perineum: blood absent, amniotic fluid absent Cervical Exam: 7 cm dilated 100% effaced 0 station Presenting Part: cephalic Membranes:  Intact Fetal Heart Rate: Reactive Prenatal Labs:  
Lab Results Component Value Date/Time  
 Rubella, External IMMUNE  12/29/2015 GrBStrep, External NEGATIVE  06/29/2016 HBsAg, External NEGATIVE  12/29/2015 HIV, External NON REACTIVE  12/29/2015 RPR, External NON REACTIVE  12/29/2015 Gonorrhea, External NEGATIVE  06/29/2016 Chlamydia, External NEGATIVE  12/29/2015 Chlamydia, External 06/29/16 12/29/2015 Assessment/Plan:  
 
Plan: Admit for Reassuring fetal status, Labor  Progressing normally, Continue plan for vaginal delivery. Group B Strep was negative. Signed By:  Shayla Shoemaker DO   
 January 31, 2019

## 2019-02-01 NOTE — L&D DELIVERY NOTE
Delivery Note    Obstetrician:  Mary Lindo DO    Assistant: none    Pre-Delivery Diagnosis: Term pregnancy, Spontaneous labor, Single fetus and Uncomplicated pregnancy    Post-Delivery Diagnosis: Living  infant(s) and Female    Intrapartum Event: None    Procedure: Spontaneous vaginal delivery    Epidural: YES    Monitor:  Fetal Heart Tones - External and Uterine Contractions - External    Indications for instrumental delivery: none    Estimated Blood Loss:     Episiotomy: none    Laceration(s):  none    Laceration(s) repair: NO    Presentation: Cephalic    Fetal Description: salazar    Fetal Position: Right Occiput Anterior    Birth Weight:     Birth Length:     Apgar - One Minute: 8    Apgar - Five Minutes: 9    Umbilical Cord: 3 vessels present and Cord blood sent to lab for type, Rh, and Arnie' test    Specimens: none           Complications:  none           Cord Blood Results:   Information for the patient's :  Sudhakar Wiggins [[id number]]   No results found for: PCTABR, ABORH, PCTDIG, BILI, ABORH, ABORHEXT    Prenatal Labs:     Lab Results   Component Value Date/Time    ABO/Rh(D) O POSITIVE 2019 09:50 PM    ABO,Rh O POSITIVE  2015    HBsAg, External NEGATIVE  2015    HIV, External NON REACTIVE  2015    Rubella, External IMMUNE  2015    RPR, External NON REACTIVE  2015    Gonorrhea, External NEGATIVE  2016    Chlamydia, External NEGATIVE  2015    Chlamydia, External 16    GrBStrep, External NEGATIVE  2016        Attending Attestation: I performed the procedure    Signed By:  Mary Lindo DO     2019

## 2019-02-01 NOTE — ANESTHESIA POSTPROCEDURE EVALUATION
* No procedures listed *. Anesthesia Post Evaluation Multimodal analgesia: multimodal analgesia not used between 6 hours prior to anesthesia start to PACU discharge Patient location during evaluation: bedside Patient participation: complete - patient participated Level of consciousness: awake and alert Pain score: 0 Pain management: satisfactory to patient Airway patency: patent Anesthetic complications: no 
Cardiovascular status: acceptable Hydration status: acceptable Post anesthesia nausea and vomiting:  none Visit Vitals /63 (BP 1 Location: Right arm, BP Patient Position: At rest;Supine) Pulse 72 Temp 36.7 °C (98 °F) Resp 14 SpO2 99% Breastfeeding?  Yes

## 2019-02-01 NOTE — ROUTINE PROCESS
Bedside and Verbal shift change report given to KATINA Goodwin (oncoming nurse) by Poncho Araujo (offgoing nurse). Report included the following information SBAR, Kardex, OR Summary, Procedure Summary, Intake/Output, MAR, Recent Results and Med Rec Status. Assumed care of pt. Introduced myself and updated whiteboard, explained nursing plan of care for my shift. Pt alert and oriented; assessment and vitals completed, WNL. Pt denies headache, visual disturbances and epigastric pain. Pt verbalized agreement to plan. Questions answered.

## 2019-02-01 NOTE — ANESTHESIA PROCEDURE NOTES
Epidural Block Start time: 1/31/2019 10:32 PM 
End time: 1/31/2019 10:52 PM 
Performed by: Leandra Lynn CRNA Authorized by: Leandra Lynn CRNA Pre-Procedure Indication: labor epidural   
Preanesthetic Checklist: patient identified, risks and benefits discussed, anesthesia consent, site marked, patient being monitored, timeout performed and anesthesia consent Timeout Time: 22:34 Epidural:  
Patient position:  Seated Prep region:  Lumbar Prep: Betadine and Patient draped Location:  L3-4 Needle and Epidural Catheter:  
Needle Type:  Tuohy Needle Gauge:  18 G Injection Technique:  Loss of resistance using saline Attempts:  1 Catheter Size:  20 G Catheter at Skin Depth (cm):  15 Depth in Epidural Space (cm):  5 Events: no blood with aspiration, no cerebrospinal fluid with aspiration, no paresthesia and negative aspiration test   
Test Dose:  Negative Assessment:  
Catheter Secured:  Tegaderm and tape Insertion:  Uncomplicated Patient tolerance:  Patient tolerated the procedure well with no immediate complications

## 2019-02-02 VITALS
DIASTOLIC BLOOD PRESSURE: 89 MMHG | HEART RATE: 73 BPM | TEMPERATURE: 98.1 F | SYSTOLIC BLOOD PRESSURE: 142 MMHG | RESPIRATION RATE: 16 BRPM | OXYGEN SATURATION: 100 %

## 2019-02-02 PROBLEM — O26.843 UTERINE SIZE DATE DISCREPANCY PREGNANCY, THIRD TRIMESTER: Status: RESOLVED | Noted: 2018-12-13 | Resolved: 2019-02-02

## 2019-02-02 PROBLEM — Z34.90 PREGNANCY: Status: RESOLVED | Noted: 2018-10-15 | Resolved: 2019-02-02

## 2019-02-02 RX ORDER — IBUPROFEN 800 MG/1
800 TABLET ORAL
Qty: 30 TAB | Refills: 0 | Status: SHIPPED | OUTPATIENT
Start: 2019-02-02

## 2019-02-02 NOTE — ROUTINE PROCESS
Verbal shift change report given to Melanie Davis RN (oncoming nurse) by Halle Shoemaker RN (offgoing nurse). Report included the following information SBAR, MAR, Accordion and Recent Results. 0845 - rounded on pt. Pt up from bathroom and now eating breakfast.  Pt has no c/o pain and no questions/concerns regarding care. Will continue to monitor. 1230 - Pt given discharge instructions and verbalized understand, denies questions.  
 
1330 - pt taken down to lobby via wheelchair by .

## 2019-02-02 NOTE — DISCHARGE SUMMARY
Obstetrical Discharge Summary       [address] [facility]  [address] [address]              Patient ID:  Shayy Morrison  [id number]  85 y.o.  1985    Admit Date: 2019    Discharge Date: 2019     Admitting Physician: Iris Miranda DO     Admission Diagnoses: Pregnancy    Discharge Diagnoses: same as above      Additional Diagnoses: none        Hospital Course: Unremarkable. She has done well postpartum. She has normal pain and bleeding. She is breastfeeding well. She denies postpartum emotional concerns. Information for the patient's :  Juany Meter [[id number]]          Immunizations:    Immunization History   Administered Date(s) Administered    Influenza Vaccine 10/24/2018    Tdap 2016, 2018       Group Beta Strep:   GrBStrep, External   Date Value Ref Range Status   2019 negative  Final        Visit Vitals  /68   Pulse 71   Temp 97.9 °F (36.6 °C)   Resp 16   LMP 2018 (Exact Date)   SpO2 99%   Breastfeeding? Yes       Vital signs stable, afebrile. Exam:  Patient without distress. Abdomen soft, fundus firm at level of umbilicus, non tender               Perineum with normal lochia noted. Lower extremities are negative for swelling, cords or tenderness. Patient Instructions:   Current Discharge Medication List      START taking these medications    Details   !! ibuprofen (MOTRIN) 800 mg tablet Take 1 Tab by mouth every eight (8) hours as needed. Qty: 30 Tab, Refills: 0       !! - Potential duplicate medications found. Please discuss with provider. CONTINUE these medications which have NOT CHANGED    Details   PNV no.24-iron-folic acid-dha (PRENATAL DHA+COMPLETE PRENATAL) 30975-300 mg-mcg-mg cmpk Take  by mouth.     Associated Diagnoses: Missed menses      !! ibuprofen (MOTRIN) 800 mg tablet Take 1 Tab by mouth every eight (8) hours as needed. Qty: 30 Tab, Refills: 1      senna-docusate (PERICOLACE) 8.6-50 mg per tablet Take 1 Tab by mouth two (2) times daily as needed for Constipation. Qty: 60 Tab, Refills: 0      AMBULATORY BREAST PUMP Double electric breast pump of patient's choice. Use as directed for breast feeding. Qty: 1 Units, Refills: 0    Associated Diagnoses: Supervision of normal pregnancy, third trimester       !! - Potential duplicate medications found. Please discuss with provider. STOP taking these medications       norethindrone (MICRONOR) 0.35 mg tab Comments:   Reason for Stopping:         calcium carbonate (TUMS) 200 mg calcium (500 mg) chew Comments:   Reason for Stopping:               See discharge instructions provided by nursing. Follow-up in 6 weeks.     Signed:  Collette Perkins MD  2/2/2019  7:45 AM

## 2019-02-02 NOTE — DISCHARGE INSTRUCTIONS
POST DELIVERY DISCHARGE INSTRUCTIONS    Name: Xavier Lopez  YOB: 1985  Primary Diagnosis: Active Problems:    Spontaneous vaginal delivery (2019)        General:     Diet/Diet Restrictions:  Eight 8-ounce glasses of fluid daily (water, juices); avoid excessive caffeine intake. Meals/snacks as desired which are high in fiber and carbohydrates and low in fat and cholesterol. Physical Activity / Restrictions / Safety:     Avoid heavy lifting, no more that 8 lbs. For 2-3 weeks; limit use of stairs to 2 times daily for the first week home. No driving for one week. Avoid intercourse 4-6 weeks, no douching or tampon use. Check with obstetrician before starting or resuming an exercise program.         Discharge Instructions/Special Treatment/Home Care Needs:     Continue prenatal vitamins. Continue to use squirt bottle with warm water on your episiotomy after each bathroom use until bleeding stops. If steri-strips applied to your incision, remove in 7-10 days. Call your doctor for the following:     Fever over 101 degrees by mouth. Vaginal bleeding heavier than a normal menstrual period or clot larger than a golf ball. Red streaks or increased swelling of legs, painful red streaks on your breast.  Painful urination, constipation and increased pain or swelling or discharge with your incision. If you feel extremely anxious or overwhelmed. If you have thoughts of harming yourself and/or your baby. Pain Management:     Pain Management:   Take Acetaminophen (Tylenol) or Ibuprofen (Advil, Motrin), as directed for pain. Use a warm Sitz bath 3 times daily to relieve episiotomy or hemorrhoidal discomfort. Heating pad to  incision as needed. For hemorrhoidal discomfort, use Tucks and Anusol cream as needed and directed. Follow-Up Care:      These are general instructions for a healthy lifestyle:    No smoking/ No tobacco products/ Avoid exposure to second hand smoke    Surgeon General's Warning:  Quitting smoking now greatly reduces serious risk to your health. Obesity, smoking, and sedentary lifestyle greatly increases your risk for illness    A healthy diet, regular physical exercise & weight monitoring are important for maintaining a healthy lifestyle    Recognize signs and symptoms of STROKE:    F-face looks uneven    A-arms unable to move or move unevenly    S-speech slurred or non-existent    T-time-call 911 as soon as signs and symptoms begin-DO NOT go       Back to bed or wait to see if you get better-TIME IS BRAIN.         Signed By: Ed Ruvalcaba RN                                                                                                   Date: 2/2/2019 Time: 12:20 PM

## 2019-03-21 ENCOUNTER — ROUTINE PRENATAL (OUTPATIENT)
Dept: OBGYN CLINIC | Age: 34
End: 2019-03-21

## 2019-03-21 VITALS
HEART RATE: 74 BPM | BODY MASS INDEX: 37.11 KG/M2 | SYSTOLIC BLOOD PRESSURE: 148 MMHG | DIASTOLIC BLOOD PRESSURE: 100 MMHG | WEIGHT: 274 LBS | HEIGHT: 72 IN | RESPIRATION RATE: 18 BRPM

## 2019-03-21 NOTE — PROGRESS NOTES
Subjective:   Ms. Stephie Jeffries is a 36 y.o. who is now 6 weeks postpartum. OB History        2    Para   2    Term   2            AB        Living   2       SAB        TAB        Ectopic        Molar        Multiple   0    Live Births   1              Method of delivery: normal spontaneous vaginal delivery  She is breast-feeding and is not experiencing problems. Pregnancy complications: none. She is feeling well and happy. She currently uses abstinence for contraception. She plans to use condoms for contraception. Current Outpatient Medications   Medication Sig Dispense Refill    ibuprofen (MOTRIN) 800 mg tablet Take 1 Tab by mouth every eight (8) hours as needed. 30 Tab 0    ibuprofen (MOTRIN) 800 mg tablet Take 1 Tab by mouth every eight (8) hours as needed. 30 Tab 1    senna-docusate (PERICOLACE) 8.6-50 mg per tablet Take 1 Tab by mouth two (2) times daily as needed for Constipation. 60 Tab 0    AMBULATORY BREAST PUMP Double electric breast pump of patient's choice. Use as directed for breast feeding. 1 Units 0    PNV no.24-iron-folic acid-dha (PRENATAL DHA+COMPLETE PRENATAL) -300 mg-mcg-mg cmpk Take  by mouth.        No Known Allergies  Past Medical History:   Diagnosis Date    Hypoglycemia     Hypoglycemia 2015    Normal pregnancy 2016    Post-term pregnancy, 40-42 weeks of gestation 2016    Spontaneous vaginal delivery 2019     Past Surgical History:   Procedure Laterality Date    HX OTHER SURGICAL Right 10/2015    right upper second molar taken out     Family History   Problem Relation Age of Onset    Hypertension Mother     Diabetes Mother     High Cholesterol Mother     Heart Disease Father     Hypertension Father     High Cholesterol Father      Social History     Tobacco Use    Smoking status: Never Smoker    Smokeless tobacco: Never Used   Substance Use Topics    Alcohol use: No        Objective:   Physical Exam:  Date of last Pap smear: 9/25/17  Physical Exam: GENERAL APPEARANCE: alert, well appearing, in no apparent distress  NEUROLOGIC: alert, oriented, normal speech, no focal findings or movement disorder noted  EXTERNAL GENITALIA POSTPARTUM: normal, well-healed, without lesions or masses  VAGINA POSTPARTUM: normal, well-healed, physiologic discharge, without lesions  CERVIX POSTPARTUM: normal, well-healed, without lesions  UTERUS POSTPARTUM: normal size, well involuted, firm, non-tender  ADNEXA POSTPARTUM: no masses palpable and nontender    Assessment/Plan:   normal postpartum exam  able to resume normal activities  See orders and Patient Instructions   Plan of care discussed. Patient expressed understanding.

## 2021-03-29 ENCOUNTER — HOSPITAL ENCOUNTER (OUTPATIENT)
Dept: PHYSICAL THERAPY | Age: 36
Discharge: HOME | End: 2021-03-29
Payer: COMMERCIAL

## 2021-03-29 PROCEDURE — 97530 THERAPEUTIC ACTIVITIES: CPT

## 2021-03-29 PROCEDURE — 97110 THERAPEUTIC EXERCISES: CPT

## 2021-03-29 PROCEDURE — 97161 PT EVAL LOW COMPLEX 20 MIN: CPT

## 2021-03-29 NOTE — PROGRESS NOTES
PT DAILY TREATMENT NOTE/LUMBAR EVAL     Patient Name: Juli Belcher  Date:3/29/2021  : 1985  [x]  Patient  Verified  Payor: Timothy Garzon / Plan: [place] [employer] [place] / Product Type: PPO /    In time:5:35  Out time:6:25  Total Treatment Time (min): 50  Visit #: 1 of 8    Treatment Area: Low back pain [M54.5]  SUBJECTIVE  Pain Level (0-10 scale): 1  []constant [x]intermittent []improving []worsening []no change since onset    Any medication changes, allergies to medications, adverse drug reactions, diagnosis change, or new procedure performed?: [x] No    [] Yes (see summary sheet for update)  Subjective functional status/changes:     PLOF: step aerobics, 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 Radicular pain down left LE to knee by Thursday, and then driving over the weekend went down right LE. Current symptoms/Complaints: Has completed prone press ups with some relief. Increased back pain since last pregnancy . Radicular symptoms staying above knees with switching sides that increases with standing > 45min. Feels like low back gets locked first thing in the morning when trying to sit up. Denies red flags. Pain increases with prolonged sitting and standing 8/10. Decreases to 0/10 with resting, sidelying. 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 \"    OBJECTIVE/EXAMINATION      34 min [x]Eval                  []Re-Eval       8 min Therapeutic Exercise:  [] See flow sheet : 90/90, pelvic tilt, cat/cow, seated hip IR, long sit reach    Rationale: increase ROM and increase strength to improve the patients ability to perform daily activities with decreased pain and symptom levels    8 min Therapeutic Activity:  []  See flow sheet : pt education on exam findings, POC, HEP, BP monitoring   Rationale: increase strength, improve coordination and increase proprioception  to improve the patients ability to perform daily activities with decreased pain and symptom levels        With   [] TE   [] TA   [] neuro   [] other: Patient Education: [x] Review HEP    [] Progressed/Changed HEP based on:   [] positioning   [] body mechanics   [] transfers   [] heat/ice application    [] other:      Other Objective/Functional Measures: see initial eval     OBJECTIVE  Posture: right thoracic and left lumbar scoliosis curve   Lateral Shift: [] R    [] L     [] +  [] -  Kyphosis: [] Increased [] Decreased   []  WNL  Lordosis:  [x] Increased [] Decreased   [] WNL  Pelvic symmetry: [] WNL    [] Other:    Gait:  [] Normal     [] Abnormal:    Active Movements: [] N/A   [] Too acute   [] Other:  ROM  AROM Comments   Forward flexion  8.5in     Extension     SB right WFL    SB left WFL    Right trunk rotation  15.5in    Left trunk rotation  16.5in      Strength   L(0-5) R (0-5) N/T   Hip Flexion  5 5 []   Knee Extension  5 5 []   Knee Flexion  5 5 []   Great Toe Extension   []   Ankle PF 5 5 []   Ankle DF 5 5 []   Hip ABD 3+ 3+ []   Hip Extension  5 5 []   Hip ER 4 5 []   Hip IR 4 4+ []     Hip ROM  IR: left 31*, right 31*  ER: left 20*, right 16*    Neuro Screen [] WNL  Myotome/Dermatome/Reflexes:  Comments:    Special tests:  SLR:  [x] R    [x] L    [] +    [x] -  Slump Test: [x] R    [x] L    [] +    [x] -    SI Compression:[] +    [] -   SI Gapping:  [] +    [] -     Beatriz:  [x] R    [x] L    [] +    [x] -   Scour:  [] R    [] L    [] +    [] -   FADDIR: [x] R    [x] L    [] +    [x] -   HS flexibility: right:          left:     Laura's: [] R    [] L    [] +    [] -   Brandyn:[] R    [] L    [] +    [] -     SL balance: right WFL, left WFL  Plank:      Palpation  [] Min  [x] Mod  [] Severe    Location: right QL, bilateral lumbar paraspinals, right piriformis   [] Min  [] Mod  [] Severe    Location:    HR 75bpm  O2: 98-99%  BP: 140/100 left arm, at end of session 150/98      Pain Level (0-10 scale) post treatment: 0    ASSESSMENT/Changes in Function: Pt is a 32yo female presenting to therapy as direct access with c/c low back pain after cleaning grout at home on 3/21/21. Pt reporting occasional radicular down bilateral LE not going past knees with denying red flags at this time. Pt reporting recent diagnosis of scoliosis as well with unsure of lu angle however S curve noted during exam with right thoracic and left lumbar curve. 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 Pain increases with standing > 45min, completing step aerobics, and sitting for prolonged periods up to 8/10 with c/o locking first thing in the morning when sitting up straight. . Pain decreases with rest, sidelying and exercises to 0/10. Pt demonstrates [place] [place] lumbar and trunk ROM however unable to reverse lordosis with forward flexion, decreased bilateral hip strength, decreased bilateral hip IR/ER AROM, negative SLR, slump, and FRANCIS, TTP over right QL and piriformis and bilateral lumbar paraspinals. Signs and symptoms consistent with mechanical low back pain. Pt's BP was taken at beginning of session with 140/100 and 150/98 at end with education to continue to monitor at home and f/u with MD. Pt would benefit from skilled PT services to address the above impairments to allow pt to complete ADLs with increased ease. Patient will continue to benefit from skilled PT services to modify and progress therapeutic interventions, address functional mobility deficits, address strength deficits, analyze and cue movement patterns, analyze and modify body mechanics/ergonomics, assess and modify postural abnormalities and instruct in home and community integration to attain remaining goals.      []  See Plan of Care  []  See progress note/recertification  []  See Discharge Summary         Progress towards goals / Updated goals:  Short Term Goals: STG- To be accomplished in 2 week(s):  1. Pt will be independent with HEP to encourage prophylaxis. Eval: HEP dispensed     Long Term Goals: LTG- To be accomplished in 4 week(s):  1. Pt will report >/=80% improvement in symptoms to be able to complete ADLs with increased ease. Eval: 8/10 max pain with standing > 45min, prolonged sitting in low back    2. Pt hip abduction strength will improve to at least 4+/5 to be able to return to step aerobics without back pain  Eval:3+/5 hip abd MMT bilaterally     3. Pt bilateral hip IR/ER AROM will improve to > 35* to allow pt to complete work duties with less pain. Eval:  IR: left 31*, right 31*  ER: left 20*, right 16*    4. Pt FOTO score will increase by >/= 29 points to show improvement in subjective function.   Eval:42  Current: will address at visit 5      PLAN  []  Upgrade activities as tolerated     [x]  Continue plan of care  []  Update interventions per flow sheet       []  Discharge due to:_  []  Other:Brissa Mojica 3/29/2021  5:32 PM

## 2021-03-29 NOTE — PROGRESS NOTES
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 Assessment/ key information: Pt is a 32yo female presenting to therapy as direct access with c/c low back pain after cleaning grout at home on 3/21/21. Pt reporting occasional radicular down bilateral LE not going past knees with denying red flags at this time. Pt reporting recent diagnosis of scoliosis as well with unsure of lu angle however S curve noted during exam with right thoracic and left lumbar curve. 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 Pain increases with standing > 45min, completing step aerobics, and sitting for prolonged periods up to 8/10 with c/o locking first thing in the morning when sitting up straight. . Pain decreases with rest, sidelying and exercises to 0/10. Pt demonstrates [place] [place] lumbar and trunk ROM however unable to reverse lordosis with forward flexion, decreased bilateral hip strength, decreased bilateral hip IR/ER AROM, negative SLR, slump, and FRANCIS, TTP over right QL and piriformis and bilateral lumbar paraspinals. Signs and symptoms consistent with mechanical low back pain.  Pt's BP was taken at beginning of session with 140/100 and 150/98 at end with education to continue to monitor at home and f/u with MD. Pt would benefit from skilled PT services to address the above impairments to allow pt to complete ADLs with increased ease. Evaluation Complexity History MEDIUM  Complexity : 1-2 comorbidities / personal factors will impact the outcome/ POC ; Examination MEDIUM Complexity : 3 Standardized tests and measures addressing body structure, function, activity limitation and / or participation in recreation  ;Presentation LOW Complexity : Stable, uncomplicated  ;Clinical Decision Making MEDIUM Complexity : FOTO score of 26-74  Overall Complexity Rating: LOW   Problem List: pain affecting function, decrease ROM, decrease strength, impaired gait/ balance, decrease ADL/ functional abilitiies, decrease activity tolerance, decrease flexibility/ joint mobility and decrease transfer abilities   Treatment Plan may include any combination of the following: Therapeutic exercise, Therapeutic activities, Neuromuscular re-education, Physical agent/modality, Gait/balance training, Manual therapy, Patient education, Self Care training, Functional mobility training and Stair training  Patient / Family readiness to learn indicated by: asking questions, trying to perform skills and interest  Persons(s) to be included in education: patient (P)  Barriers to Learning/Limitations: None  Patient Goal (s): To do everything I want and need to do pain free. \"  Patient Self Reported Health Status: excellent  Rehabilitation Potential: good    Short Term Goals: STG- To be accomplished in 2 week(s):  1. Pt will be independent with HEP to encourage prophylaxis. Eval: HEP dispensed      Long Term Goals: LTG- To be accomplished in 4 week(s):  1. Pt will report >/=80% improvement in symptoms to be able to complete ADLs with increased ease. Eval: 8/10 max pain with standing > 45min, prolonged sitting in low back     2.   Pt hip abduction strength will improve to at least 4+/5 to be able to return to step aerobics without back pain  Eval:3+/5 hip abd MMT bilaterally      3. Pt bilateral hip IR/ER AROM will improve to > 35* to allow pt to complete work duties with less pain. Eval:  IR: left 31*, right 31*  ER: left 20*, right 16*     4.  Pt FOTO score will increase by >/= 29 points to show improvement in subjective function. Eval:42  Current: will address at visit 5    Frequency / Duration: Patient to be seen 2 times per week for 4 weeks. Patient/ Caregiver education and instruction: Diagnosis, prognosis, self care, activity modification and exercises   [x]  Plan of care has been reviewed with ANNA ALICIA Remesic 3/29/2021 6:40 PM  _____________________________________________________________________  I certify that the above Therapy Services are being furnished while the patient is under my care. I agree with the treatment plan and certify that this therapy is necessary.     Physician's Signature:____________Date:_________TIME:________                                      Referred, Self, MD    ** Signature, Date and Time must be completed for valid certification **    Please sign and return to [facility] at the [address], [address] [facility]       Phone: [phone]      Fax:  [phone]

## 2021-04-01 ENCOUNTER — HOSPITAL ENCOUNTER (OUTPATIENT)
Dept: PHYSICAL THERAPY | Age: 36
Discharge: HOME | End: 2021-04-01
Payer: COMMERCIAL

## 2021-04-01 PROCEDURE — 97140 MANUAL THERAPY 1/> REGIONS: CPT

## 2021-04-01 PROCEDURE — 97110 THERAPEUTIC EXERCISES: CPT

## 2021-04-01 PROCEDURE — 97112 NEUROMUSCULAR REEDUCATION: CPT

## 2021-04-01 NOTE — PROGRESS NOTES
PT DAILY TREATMENT NOTE    Patient Name: German Dano  Date:2021  : 1985  [x]  Patient  Verified  Payor: Raymundo Miranda / Plan: [place] [employer] PPO / Product Type: PPO /    In time:2:34  Out time:3:22  Total Treatment Time (min): 48  Total Timed Codes (min): 48  1:1 Treatment Time ( only): n/a   Visit #: 1 of 8    Treatment Area: Low back pain [M54.5]    SUBJECTIVE  Pain Level (0-10 scale): 0/10  Any medication changes, allergies to medications, adverse drug reactions, diagnosis change, or new procedure performed?: [x] No    [] Yes (see summary sheet for update)  Subjective functional status/changes:   [] No changes reported  Pt reports that she felt great after the eval. Reports that she is compliant with HEP. Reportshtat her standing ability for aprox 30 minutes. OBJECTIVE:  Pt enters gym in no apparent distress  Vitals: 130/80 mmHg    Modalities Rationale:    Pt declined    25 min Therapeutic Exercise:  [x] See flow sheet :   Rationale:  increase ROM and increase strength to improve the patients ability to perform daily activities with decreased pain and symptom levels       15 min Neuromuscular Re-education:  [x]  See flow sheet :   Rationale: improve coordination, improve balance and increase proprioception  to improve the patients ability to perform daily activities with decreased pain and symptom levels      8 min Manual Therapy:  MFR intramuscular to R QL, MFR superficial to right lumbar paraspinals, STM to R lumbar paraspinals   Rationale: decrease pain, increase tissue extensibility and decrease trigger points to improve the patients ability to perform daily activities with decreased pain and symptom levels    The manual therapy interventions were performed at a separate and distinct time from the therapeutic activities interventions.           With   [x] TE   [] TA   [] neuro   [] other: Patient Education: [x] Review HEP    [] Progressed/Changed HEP based on:   [] positioning   [] body mechanics   [] transfers   [] heat/ice application    [x] other:  Pt requiring verbal cuing with squats to keep knees wide, avoid knees passing toes anteriorly     Other Objective/Functional Measures: TTP and trigger points noted at right QL and R lumbar paraspinals     Pain Level (0-10 scale) post treatment: 0/10    ASSESSMENT/Changes in Function: Patient tolerated therapy session well as there were no adverse reactions today except felt some discomfort with transitioning from supine to prone position. Pt requiring tactile cuing with performing TA contraction and PPT. Pt had a hard time with keeping TA and PPT while performing exercises. Pt with noted muscle hypertonicity so performing manual techniques and pt felt good afterwards. Pt is progressing with therapy as indicated by pt tolerating increase in exercise repetitions and resistance. Although showing progress patient would benefit from continuation of skilled physical therapy to address the remaining limitations. Patient will continue to benefit from skilled PT services to  modify and progress therapeutic interventions, address functional mobility deficits, address ROM deficits, address strength deficits, analyze and address soft tissue restrictions, analyze and cue movement patterns, analyze and modify body mechanics/ergonomics and assess and modify postural abnormalities to attain remaining goals. [x]  See Plan of Care  []  See progress note/recertification  []  See Discharge Summary         Progress towards goals / Updated goals:  Short Term Goals: STG- To be accomplished in 2 week(s):  1.  Pt will be independent with HEP to encourage prophylaxis. Eval: HEP dispensed   Current 4/1/21: Reviewed and pt appeared to understand.     Long Term Goals: LTG- To be accomplished in 4 week(s):  1.  Pt will report >/=80% improvement in symptoms to be able to complete ADLs with increased ease.    Eval: 8/10 max pain with standing > 45min, prolonged sitting in low back  Current 4/1/21: Pt reporting able to stand for 30 minutes     2.  Pt hip abduction strength will improve to at least 4+/5 to be able to return to step aerobics without back pain  Eval:3+/5 hip abd MMT bilaterally   Current 4/1/21: Pt feeling fatigued after doing band walks     3.  Pt bilateral hip IR/ER AROM will improve to > 35* to allow pt to complete work duties with less pain.   Eval:  IR: left 31*, right 31*  ER: left 20*, right 16*  Current 4/1/21: Progressing     4.  Pt FOTO score will increase by >/= 29 points to show improvement in subjective function.   Eval:42  Current: will address at visit 5      PLAN  [x]  Upgrade activities as tolerated     [x]  Continue plan of care  [x]  Update interventions per flow sheet       []  Discharge due to:_   []  Other:_      Hemanth Wynne, PT, DPT, [facility] 4/1/2021  2:35 PM    Future Appointments   Date Time Provider [place] Sánchez   4/6/2021  8:45 AM Loniselal Mg, PT [facility]   4/7/2021  1:45 PM Caroline Holliday, PT [facility]   4/13/2021  4:15 PM Alonzo Barnard [facility]   4/15/2021  5:30 PM Rahel Vargas PTA [facility]   4/20/2021  1:45 PM Gerson Rollins, PT [facility]   4/22/2021  8:45 AM Loniselal Mg, PT [facility]   4/26/2021  3:30 PM Alonzo Barnard [facility]   4/28/2021  4:00 PM Prudy Ria, PT [facility]

## 2021-04-06 ENCOUNTER — APPOINTMENT (OUTPATIENT)
Dept: PHYSICAL THERAPY | Age: 36
End: 2021-04-06
Payer: COMMERCIAL

## 2021-04-07 ENCOUNTER — APPOINTMENT (OUTPATIENT)
Dept: PHYSICAL THERAPY | Age: 36
End: 2021-04-07
Payer: COMMERCIAL

## 2021-04-09 ENCOUNTER — HOSPITAL ENCOUNTER (OUTPATIENT)
Dept: PHYSICAL THERAPY | Age: 36
Discharge: HOME | End: 2021-04-09
Payer: COMMERCIAL

## 2021-04-09 PROCEDURE — 97110 THERAPEUTIC EXERCISES: CPT

## 2021-04-09 PROCEDURE — 97112 NEUROMUSCULAR REEDUCATION: CPT

## 2021-04-09 NOTE — PROGRESS NOTES
PT DAILY TREATMENT NOTE    Patient Name: Hany Elaine  Date:2021  : 1985  [x]  Patient  Verified  Payor: Adrienne Stern / Plan: [place] [employer] [place] / Product Type: PPO /    In time: 4:10  Out time:4:55  Total Treatment Time (min): 45  Total Timed Codes (min): 45    Visit #: 3 of 8    Treatment Area: Low back pain [M54.5]    SUBJECTIVE  Pain Level (0-10 scale): 0  Any medication changes, allergies to medications, adverse drug reactions, diagnosis change, or new procedure performed?: [x] No    [] Yes (see summary sheet for update)  Subjective functional status/changes:   [] No changes reported  The pt reported feeling well today    OBJECTIVE    35 min Therapeutic Exercise:  [x]? See flow sheet :   Rationale:  increase ROM and increase strength to improve the patients ability to perform daily activities with decreased pain and symptom levels        10 min Neuromuscular Re-education:  [x]? See flow sheet :   Rationale: improve coordination, improve balance and increase proprioception  to improve the patients ability to perform daily activities with decreased pain and symptom levels            With   [x] TE   [] TA   [] neuro   [] other: Patient Education: [x] Review HEP    [] Progressed/Changed HEP based on:   [] positioning   [] body mechanics   [] transfers   [] heat/ice application    [] other:      Other Objective/Functional Measures: The pt needed VC to reduce pronation during bosu step ups, and needed VC to reduce genu valgum during latera steps with TB. Pain Level (0-10 scale) post treatment: 0    ASSESSMENT/Changes in Function: The pt reported to therapy with a pleasant attitude. She reported felling no symptoms throughout therapy and stated that if she did not have pain over the weekend during her step-up class, she would like to be D/C next treatment session.      Patient will continue to benefit from skilled PT services to  modify and progress therapeutic interventions, address functional mobility deficits, address ROM deficits, address strength deficits, analyze and address soft tissue restrictions, analyze and cue movement patterns, analyze and modify body mechanics/ergonomics and assess and modify postural abnormalities to attain remaining goals. als.     []  See Plan of Care  []  See progress note/recertification  []  See Discharge Summary         Progress towards goals / Updated goals:  Short Term Goals: STG- To be accomplished in 2 week(s):  1.  Pt will be independent with HEP to encourage prophylaxis. Eval: HEP dispensed   Current 4/1/21: Reviewed and pt appeared to understand.     Long Term Goals: LTG- To be accomplished in 4 week(s):  1.  Pt will report >/=80% improvement in symptoms to be able to complete ADLs with increased ease. Eval: 8/10 max pain with standing > 45min, prolonged sitting in low back  Current 4/1/21: Pt reporting able to stand for 30 minutes     2.  Pt hip abduction strength will improve to at least 4+/5 to be able to return to step aerobics without back pain  Eval:3+/5 hip abd MMT bilaterally   Current 4/1/21: Pt feeling fatigued after doing band walks  Current: 5/5 gross muscle strength in hips - 4/9/21 MET     3.  Pt bilateral hip IR/ER AROM will improve to > 35* to allow pt to complete work duties with less pain.   Eval:  IR: left 31*, right 31*  ER: left 20*, right 16*  Current 4/1/21: Progressing     4.  Pt FOTO score will increase by >/= 29 points to show improvement in subjective function.   Eval:42  Current: will address at visit 5    PLAN  []  Upgrade activities as tolerated     [x]  Continue plan of care  []  Update interventions per flow sheet       []  Discharge due to:_  []  Other:_      Alejandra Craig, PT 4/9/2021  4:42 PM    Future Appointments   Date Time Provider Leah Sánchez   4/13/2021  4:15 PM Remesic, [place] [facility]   4/15/2021  5:30 PM Willodean Cushing, PTA [facility]   4/20/2021  1:45 PM Sarah Camara, PT [facility]   4/22/2021  8:45 AM Jeffrey Kahn, MARTIN BALDWINHPTBMIGUEL ANGEL [facility]   4/26/2021  3:30 PM Lubna Barnard [facility]   4/28/2021  4:00 PM Hollie Duke, MARTIN LIMA [facility]

## 2021-04-13 ENCOUNTER — APPOINTMENT (OUTPATIENT)
Dept: PHYSICAL THERAPY | Age: 36
End: 2021-04-13
Payer: COMMERCIAL

## 2021-04-15 ENCOUNTER — HOSPITAL ENCOUNTER (OUTPATIENT)
Dept: PHYSICAL THERAPY | Age: 36
Discharge: HOME | End: 2021-04-15
Payer: COMMERCIAL

## 2021-04-15 PROCEDURE — 97110 THERAPEUTIC EXERCISES: CPT

## 2021-04-15 PROCEDURE — 97530 THERAPEUTIC ACTIVITIES: CPT

## 2021-04-15 NOTE — PROGRESS NOTES
PT DAILY TREATMENT NOTE    Patient Name: Judy Eagle  Date:4/15/2021  : 1985  [x]  Patient  Verified  Payor: Josemanuel Carrillo / Plan: [place] [employer] / Product Type: PPO /    In time:5:35  Out time:6:13  Total Treatment Time (min): 38  Total Timed Codes (min): 38  1:1 Treatment Time ( only): 38   Visit #: 4 of 8    Treatment Area: Low back pain [M54.5]    SUBJECTIVE  Pain Level (0-10 scale): 0  Any medication changes, allergies to medications, adverse drug reactions, diagnosis change, or new procedure performed?: [x] No    [] Yes (see summary sheet for update)  Subjective functional status/changes:   [] No changes reported  \"Im doing great. \"     OBJECTIVE      30 min Therapeutic Exercise:  [x] See flow sheet :   Rationale: increase ROM, increase strength and improve coordination to improve the patients ability to perform pain free ADL's     8 min Therapeutic Activity:  [x]  See flow sheet :   Rationale: increase ROM, increase strength and improve coordination  to improve the patients ability to perform pain free ADL's            With   [x] TE   [] TA   [] neuro   [] other: Patient Education: [x] Review HEP    [] Progressed/Changed HEP based on:   [] positioning   [] body mechanics   [] transfers   [] heat/ice application    [] other:      Other Objective/Functional Measures:   Hip Abd strength   Right 4/5  Left 4/5  Hip ER   Right 30 Left 30  Hip IR    Right 30 Left 30     FOTO 94    Pain Level (0-10 scale) post treatment: 0    ASSESSMENT/Changes in Function:   Pt reported no pain since previous session and was able to go to step aerobics without pain. Pt reported 100% improvement since starting therapy and has returned to performing all ADL's and exercises activity with no c/o pain. Noted significant improvement in glut strength however some strength deficits remain. Pt was given updated HEP and reviewed. Pt reported understanding.      Patient will continue to benefit from skilled PT services to modify and progress therapeutic interventions, address functional mobility deficits, address ROM deficits, address strength deficits, analyze and address soft tissue restrictions, analyze and cue movement patterns, analyze and modify body mechanics/ergonomics, assess and modify postural abnormalities, address imbalance/dizziness and instruct in home and community integration to attain remaining goals. []  See Plan of Care  []  See progress note/recertification  []  See Discharge Summary         Progress towards goals / Updated goals:  Short Term Goals: STG- To be accomplished in 2 week(s):  1.  Pt will be independent with HEP to encourage prophylaxis. Eval: HEP dispensed   Current 4/15/21: Reviewed and updated  pt reported understanding : MET      Long Term Goals: LTG- To be accomplished in 4 week(s):  1.  Pt will report >/=80% improvement in symptoms to be able to complete ADLs with increased ease. Eval: 8/10 max pain with standing > 45min, prolonged sitting in low back  Current 4/15/21: Pt reporting able to stand for 30 minutes: MET      2.  Pt hip abduction strength will improve to at least 4+/5 to be able to return to step aerobics without back pain  Eval:3+/5 hip abd MMT bilaterally   Current 4/1/21: Pt feeling fatigued after doing band walks  Current: 5/5 gross muscle strength in hips - 4/9/21 MET     3.  Pt bilateral hip IR/ER AROM will improve to > 35* to allow pt to complete work duties with less pain.   Eval:  IR: left 31*, right 31*  ER: left 20*, right 16*  Current : Progressing 4/15/21   Hip ER   Right 30 Left 30  Hip IR    Right 30 Left 30     4.  Pt FOTO score will increase by >/= 29 points to show improvement in subjective function.   Eval:42  Current: 94/100 MET : 4/15/21    PLAN  [x]  Upgrade activities as tolerated     [x]  Continue plan of care  []  Update interventions per flow sheet       []  Discharge due to:_  []  Other:_      Rianna Mulligan, PTA 4/15/2021  5:36 PM    Future Appointments   Date Time Provider Leah Sánchez   4/20/2021  1:45 PM MARTIN CanasHPTBMIGUEL ANGEL [facility]   4/22/2021  8:45 AM MARTIN Rangel [facility]   4/26/2021  3:30 PM Kevin Barnard [facility]   4/28/2021  4:00 PM MARTIN CanasHPTBMIGUEL ANGEL [facility]

## 2021-04-16 NOTE — PROGRESS NOTES
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  Pt will be independent with HEP to encourage prophylaxis. Eval: HEP dispensed   Current 4/15/21: Reviewed and updated  pt reported understanding : MET      Long Term Goals: LTG- To be accomplished in 4 week(s):  1.  Pt will report >/=80% improvement in symptoms to be able to complete ADLs with increased ease. Eval: 8/10 max pain with standing > 45min, prolonged sitting in low back  Current 4/15/21: Pt reporting able to stand for 30 minutes, 100% improvement in symptoms: MET      2.  Pt hip abduction strength will improve to at least 4+/5 to be able to return to step aerobics without back pain  Eval:3+/5 hip abd MMT bilaterally   Current: 5/5 gross muscle strength in hips - 21 MET     3.  Pt bilateral hip IR/ER AROM will improve to > 35* to allow pt to complete work duties with less pain.   Eval:  IR: left 31*, right 31*  ER: left 20*, right 16*    Current : Progressing 4/15/21   Hip ER                       Right 30          Left 30  Hip IR                          Right 30          Left 30     4.  Pt FOTO score will increase by >/= 29 points to show improvement in subjective function.   Eval:42  Current: 94/100 MET : 4/15/21      Assessment/ Summary of Care: Pt presented to therapy as direct access with c/c low back pain after cleaning grout at home on 3/21/21. Pt has attended 4 sessions including initial eval with reporting 100% improvement in symptoms since starting therap. Pt able to go to step aerobics class and complete ADLs without pain. Noted significant improvement in glut strength however some strength deficits remain. Pt is ready to be DC at this time due to progress towards with updated HEP given and reviewed today.      RECOMMENDATIONS:  [x]Discontinue therapy: [x]Patient has reached or is progressing toward set goals      []Patient is non-compliant or has abdicated      []Due to lack of appreciable progress towards set goals    Alonzo Harper Remesic 4/16/2021 9:41 AM

## 2021-04-20 ENCOUNTER — APPOINTMENT (OUTPATIENT)
Dept: PHYSICAL THERAPY | Age: 36
End: 2021-04-20
Payer: COMMERCIAL

## 2021-04-22 ENCOUNTER — APPOINTMENT (OUTPATIENT)
Dept: PHYSICAL THERAPY | Age: 36
End: 2021-04-22
Payer: COMMERCIAL

## 2021-04-26 ENCOUNTER — APPOINTMENT (OUTPATIENT)
Dept: PHYSICAL THERAPY | Age: 36
End: 2021-04-26
Payer: COMMERCIAL

## 2021-04-28 ENCOUNTER — APPOINTMENT (OUTPATIENT)
Dept: PHYSICAL THERAPY | Age: 36
End: 2021-04-28
Payer: COMMERCIAL

## 2023-12-27 NOTE — PROGRESS NOTES
LMOVM, please call to discuss lab results. No additional details given.
Mixed urine culture. Will repeat next visit. Please encourage patient to drink LOTS more water.
room air